# Patient Record
Sex: FEMALE | Race: WHITE | URBAN - METROPOLITAN AREA
[De-identification: names, ages, dates, MRNs, and addresses within clinical notes are randomized per-mention and may not be internally consistent; named-entity substitution may affect disease eponyms.]

---

## 2020-07-27 ENCOUNTER — OFFICE VISIT (OUTPATIENT)
Dept: URBAN - METROPOLITAN AREA CLINIC 23 | Facility: CLINIC | Age: 70
End: 2020-07-27
Payer: MEDICARE

## 2020-07-27 DIAGNOSIS — R53.83 DECREASED ENERGY: ICD-10-CM

## 2020-07-27 DIAGNOSIS — R53.82 CHRONIC FATIGUE: ICD-10-CM

## 2020-07-27 DIAGNOSIS — K86.81 EXOCRINE PANCREATIC INSUFFICIENCY: ICD-10-CM

## 2020-07-27 DIAGNOSIS — Z98.84 HISTORY OF ROUX-EN-Y GASTRIC BYPASS: ICD-10-CM

## 2020-07-27 DIAGNOSIS — Z86.010 HISTORY OF COLON POLYPS: ICD-10-CM

## 2020-07-27 PROCEDURE — G9903 PT SCRN TBCO ID AS NON USER: HCPCS | Performed by: INTERNAL MEDICINE

## 2020-07-27 PROCEDURE — 3017F COLORECTAL CA SCREEN DOC REV: CPT | Performed by: INTERNAL MEDICINE

## 2020-07-27 PROCEDURE — G8427 DOCREV CUR MEDS BY ELIG CLIN: HCPCS | Performed by: INTERNAL MEDICINE

## 2020-07-27 PROCEDURE — G8420 CALC BMI NORM PARAMETERS: HCPCS | Performed by: INTERNAL MEDICINE

## 2020-07-27 PROCEDURE — 1036F TOBACCO NON-USER: CPT | Performed by: INTERNAL MEDICINE

## 2020-07-27 PROCEDURE — 99244 OFF/OP CNSLTJ NEW/EST MOD 40: CPT | Performed by: INTERNAL MEDICINE

## 2020-07-27 PROCEDURE — G9622 NO UNHEAL ETOH USER: HCPCS | Performed by: INTERNAL MEDICINE

## 2020-07-27 RX ORDER — THIAMINE HCL 100 MG
1 TABLET TABLET ORAL ONCE A DAY
Status: ACTIVE | COMMUNITY

## 2020-07-27 RX ORDER — LIFITEGRAST 50 MG/ML
1 DROP INTO AFFECTED EYE SOLUTION/ DROPS OPHTHALMIC TWICE A DAY
Status: ACTIVE | COMMUNITY

## 2020-07-27 RX ORDER — DEXLANSOPRAZOLE 60 MG/1
1 CAPSULE CAPSULE, DELAYED RELEASE ORAL ONCE A DAY
Status: ACTIVE | COMMUNITY

## 2020-07-27 RX ORDER — MELOXICAM 15 MG/1
1 TABLET TABLET ORAL ONCE A DAY
Status: ACTIVE | COMMUNITY

## 2020-07-27 RX ORDER — BRIMONIDINE TARTRATE, TIMOLOL MALEATE 2; 5 MG/ML; MG/ML
1 DROP INTO AFFECTED EYE SOLUTION/ DROPS OPHTHALMIC TWICE A DAY
Status: ACTIVE | COMMUNITY

## 2020-07-27 RX ORDER — SODIUM, POTASSIUM,MAG SULFATES 17.5-3.13G
17.5-13.3-1.6 GM/177ML SOLUTION, RECONSTITUTED, ORAL ORAL AS DIRECTED
Qty: 354 MILLILITER | OUTPATIENT
Start: 2020-07-27

## 2020-07-27 RX ORDER — DENOSUMAB 60 MG/ML
AS DIRECTED INJECTION SUBCUTANEOUS
Status: ACTIVE | COMMUNITY

## 2020-07-27 RX ORDER — LEVETIRACETAM 500 MG
1 TABLET TABLET ORAL TWICE A DAY
Status: ACTIVE | COMMUNITY

## 2020-07-27 NOTE — HPI-TODAY'S VISIT:
70-year-old  female with history of Brittaney-en-Y gastric bypass in 2001, presents with chronic problem of abdominal distention, bloating, oily stool, abdominal pain from cramping, burping and hiccups, noisy abdominal sound.  She tried align and yogurt, Dexilant 7/2 from PCP, hyoscyamine 7/16 from PCP which did not help.  She showed me a picture of stool, soft, large amount, oily droplets on the surface of water. Also complains of fatigue.  History of anemia.  Had iron infusion twice August 2019. She is on vitamin B12 every other day.  1000 unit. Osteoporosis. She had a colon polyp 5 years ago.  No family history of colon cancer.  Denies weight loss.  Hemorrhoids.

## 2020-07-28 ENCOUNTER — LAB OUTSIDE AN ENCOUNTER (OUTPATIENT)
Dept: URBAN - METROPOLITAN AREA CLINIC 23 | Facility: CLINIC | Age: 70
End: 2020-07-28

## 2020-07-28 ENCOUNTER — TELEPHONE ENCOUNTER (OUTPATIENT)
Dept: URBAN - METROPOLITAN AREA CLINIC 23 | Facility: CLINIC | Age: 70
End: 2020-07-28

## 2020-07-28 LAB
FERRITIN, SERUM: 4121
HEMATOCRIT: 42.7
HEMOGLOBIN: 14.1
IRON BIND.CAP.(TIBC): 222
IRON SATURATION: 91
IRON: 201
MCH: 31.5
MCHC: 33
MCV: 95
NRBC: (no result)
PLATELETS: 242
RBC: 4.48
RDW: 15.9
UIBC: 21
VITAMIN B12: >2000
WBC: 5.6

## 2020-07-30 ENCOUNTER — WEB ENCOUNTER (OUTPATIENT)
Dept: URBAN - METROPOLITAN AREA CLINIC 23 | Facility: CLINIC | Age: 70
End: 2020-07-30

## 2020-07-31 ENCOUNTER — TELEPHONE ENCOUNTER (OUTPATIENT)
Dept: URBAN - METROPOLITAN AREA CLINIC 23 | Facility: CLINIC | Age: 70
End: 2020-07-31

## 2020-07-31 RX ORDER — PANCRELIPASE 36000; 180000; 114000 [USP'U]/1; [USP'U]/1; [USP'U]/1
AS DIRECTED CAPSULE, DELAYED RELEASE PELLETS ORAL
Qty: 250 | Refills: 1 | OUTPATIENT
Start: 2020-08-04 | End: 2020-10-03

## 2020-08-01 LAB — PANCREATIC ELASTASE, FECAL: 315

## 2020-08-04 ENCOUNTER — TELEPHONE ENCOUNTER (OUTPATIENT)
Dept: URBAN - METROPOLITAN AREA CLINIC 23 | Facility: CLINIC | Age: 70
End: 2020-08-04

## 2020-08-10 ENCOUNTER — TELEPHONE ENCOUNTER (OUTPATIENT)
Dept: URBAN - METROPOLITAN AREA CLINIC 23 | Facility: CLINIC | Age: 70
End: 2020-08-10

## 2020-08-11 ENCOUNTER — OFFICE VISIT (OUTPATIENT)
Dept: URBAN - METROPOLITAN AREA CLINIC 23 | Facility: CLINIC | Age: 70
End: 2020-08-11

## 2020-08-11 LAB
A/G RATIO: 0.8
ALBUMIN: 3
ALKALINE PHOSPHATASE: 254
ALT (SGPT): 562
AST (SGOT): 633
BILIRUBIN, TOTAL: 3.6
BUN/CREATININE RATIO: 19
BUN: 17
CALCIUM: 9
CARBON DIOXIDE, TOTAL: 19
CHLORIDE: 109
CREATININE: 0.91
EGFR IF AFRICN AM: 73
EGFR IF NONAFRICN AM: 63
GLOBULIN, TOTAL: 3.8
GLUCOSE: 104
HEREDITARY  HEMOCHROMATOSIS: (no result)
POTASSIUM: 4.2
PROTEIN, TOTAL: 6.8
SODIUM: 140

## 2020-08-17 ENCOUNTER — OFFICE VISIT (OUTPATIENT)
Dept: URBAN - METROPOLITAN AREA CLINIC 23 | Facility: CLINIC | Age: 70
End: 2020-08-17
Payer: MEDICARE

## 2020-08-17 ENCOUNTER — LAB OUTSIDE AN ENCOUNTER (OUTPATIENT)
Dept: URBAN - METROPOLITAN AREA CLINIC 23 | Facility: CLINIC | Age: 70
End: 2020-08-17

## 2020-08-17 DIAGNOSIS — E83.118 OTHER HEMOCHROMATOSIS: ICD-10-CM

## 2020-08-17 DIAGNOSIS — Z98.84 HISTORY OF ROUX-EN-Y GASTRIC BYPASS: ICD-10-CM

## 2020-08-17 DIAGNOSIS — K86.81 EXOCRINE PANCREATIC INSUFFICIENCY: ICD-10-CM

## 2020-08-17 DIAGNOSIS — K90.89: ICD-10-CM

## 2020-08-17 DIAGNOSIS — R74.8 ELEVATED LIVER ENZYMES: ICD-10-CM

## 2020-08-17 DIAGNOSIS — K90.9 STEATORRHEA: ICD-10-CM

## 2020-08-17 PROCEDURE — 1036F TOBACCO NON-USER: CPT | Performed by: INTERNAL MEDICINE

## 2020-08-17 PROCEDURE — 99215 OFFICE O/P EST HI 40 MIN: CPT | Performed by: INTERNAL MEDICINE

## 2020-08-17 PROCEDURE — G9903 PT SCRN TBCO ID AS NON USER: HCPCS | Performed by: INTERNAL MEDICINE

## 2020-08-17 PROCEDURE — G9622 NO UNHEAL ETOH USER: HCPCS | Performed by: INTERNAL MEDICINE

## 2020-08-17 PROCEDURE — G8417 CALC BMI ABV UP PARAM F/U: HCPCS | Performed by: INTERNAL MEDICINE

## 2020-08-17 PROCEDURE — 3017F COLORECTAL CA SCREEN DOC REV: CPT | Performed by: INTERNAL MEDICINE

## 2020-08-17 PROCEDURE — G8427 DOCREV CUR MEDS BY ELIG CLIN: HCPCS | Performed by: INTERNAL MEDICINE

## 2020-08-17 PROCEDURE — 99214 OFFICE O/P EST MOD 30 MIN: CPT | Performed by: INTERNAL MEDICINE

## 2020-08-17 PROCEDURE — 82104 ALPHA-1-ANTITRYPSIN PHENO: CPT | Performed by: INTERNAL MEDICINE

## 2020-08-17 RX ORDER — SODIUM, POTASSIUM,MAG SULFATES 17.5-3.13G
17.5-13.3-1.6 GM/177ML SOLUTION, RECONSTITUTED, ORAL ORAL AS DIRECTED
Qty: 354 MILLILITER | Status: ACTIVE | COMMUNITY
Start: 2020-07-27

## 2020-08-17 RX ORDER — MELOXICAM 15 MG/1
1 TABLET TABLET ORAL ONCE A DAY
Status: ACTIVE | COMMUNITY

## 2020-08-17 RX ORDER — DEXLANSOPRAZOLE 60 MG/1
1 CAPSULE CAPSULE, DELAYED RELEASE ORAL ONCE A DAY
Status: ACTIVE | COMMUNITY

## 2020-08-17 RX ORDER — PANCRELIPASE 36000; 180000; 114000 [USP'U]/1; [USP'U]/1; [USP'U]/1
AS DIRECTED CAPSULE, DELAYED RELEASE PELLETS ORAL
Qty: 250 | Refills: 1 | Status: ACTIVE | COMMUNITY
Start: 2020-08-04 | End: 2020-10-03

## 2020-08-17 RX ORDER — LEVETIRACETAM 500 MG
1 TABLET TABLET ORAL TWICE A DAY
Status: ACTIVE | COMMUNITY

## 2020-08-17 RX ORDER — BRIMONIDINE TARTRATE, TIMOLOL MALEATE 2; 5 MG/ML; MG/ML
1 DROP INTO AFFECTED EYE SOLUTION/ DROPS OPHTHALMIC TWICE A DAY
Status: ACTIVE | COMMUNITY

## 2020-08-17 RX ORDER — DENOSUMAB 60 MG/ML
AS DIRECTED INJECTION SUBCUTANEOUS
Status: ACTIVE | COMMUNITY

## 2020-08-17 RX ORDER — THIAMINE HCL 100 MG
1 TABLET TABLET ORAL ONCE A DAY
Status: ACTIVE | COMMUNITY

## 2020-08-17 RX ORDER — LIFITEGRAST 50 MG/ML
1 DROP INTO AFFECTED EYE SOLUTION/ DROPS OPHTHALMIC TWICE A DAY
Status: ACTIVE | COMMUNITY

## 2020-08-17 NOTE — HPI-TODAY'S VISIT:
70-year-old  female with history of bariatric Brittaney-en-Y gastric bypass in 2001, presents for follow up of steatorrhea, bloating, flatus/burping. Also, she had iron test given hx of ALEXANDER. However, iron saturation came back abnormal in very high range. HFE gene test came back negative. LFT came back elevated TbIl, AST, ALT moderately high.     No previous history of liver disease. No etoh intake or hx of excessive intake. No family hx of liver disease.   Creon helps with her steatorrhea and bloating. But high oug of pockets, paid hundreds $.   She has not scheduled colonoscopy yet.

## 2020-08-17 NOTE — PREVIOUS WORKUP REVIEWED
:ENDOSCOPIES:LABS: -Labs 7/29/2020: Total bilirubin 3.6, alkaline phosphatase 254, , , total protein 6.8, albumin 3.0, creatinine 0.91, BUN 17, hereditary hemochromatosis negative.-Labs 7/27/2020: WBC 5.6, hemoglobin 14.1, platelet 242, fecal pancreatic elastase 315 normal, ferritin 4121, vitamin B12 higher >2000, iron saturation 91%.IMAGES:

## 2020-08-21 ENCOUNTER — LAB OUTSIDE AN ENCOUNTER (OUTPATIENT)
Dept: URBAN - METROPOLITAN AREA CLINIC 23 | Facility: CLINIC | Age: 70
End: 2020-08-21

## 2020-08-21 ENCOUNTER — TELEPHONE ENCOUNTER (OUTPATIENT)
Dept: URBAN - METROPOLITAN AREA CLINIC 23 | Facility: CLINIC | Age: 70
End: 2020-08-21

## 2020-08-21 LAB
ACTIN (SMOOTH MUSCLE) ANTIBODY: 54
ALPHA-1-ANTITRYPSIN, SERUM: 135
ANA DIRECT: NEGATIVE
ANTIMYELOPEROXIDASE (MPO) ABS: <9
ANTIPROTEINASE 3 (PR-3) ABS: <3.5
ATYPICAL PANCA: (no result)
CERULOPLASMIN: 24.7
CYTOPLASMIC (C-ANCA): (no result)
HBSAG SCREEN: NEGATIVE
HEP A AB, IGM: NEGATIVE
HEP B CORE AB, TOT: NEGATIVE
HEP B SURFACE AB, QUAL: REACTIVE
HEP C VIRUS AB: 0.2
IGG, IMMUNOGLOBULIN G (RDL): 2102
MITOCHONDRIAL (M2) ANTIBODY: <20
PERINUCLEAR (P-ANCA): (no result)
PHENOTYPE (PI): (no result)

## 2020-08-21 RX ORDER — PANCRELIPASE 36000; 180000; 114000 [USP'U]/1; [USP'U]/1; [USP'U]/1
AS DIRECTED CAPSULE, DELAYED RELEASE PELLETS ORAL
Qty: 750 CAPS | Refills: 1
Start: 2020-08-04 | End: 2021-01-30

## 2020-08-24 ENCOUNTER — OFFICE VISIT (OUTPATIENT)
Dept: URBAN - METROPOLITAN AREA CLINIC 23 | Facility: CLINIC | Age: 70
End: 2020-08-24

## 2020-08-26 ENCOUNTER — TELEPHONE ENCOUNTER (OUTPATIENT)
Dept: URBAN - METROPOLITAN AREA CLINIC 77 | Facility: CLINIC | Age: 70
End: 2020-08-26

## 2020-08-27 ENCOUNTER — OUT OF OFFICE VISIT (OUTPATIENT)
Dept: URBAN - METROPOLITAN AREA MEDICAL CENTER 27 | Facility: MEDICAL CENTER | Age: 70
End: 2020-08-27
Payer: MEDICARE

## 2020-08-27 DIAGNOSIS — K86.81 EXOCRINE PANCREATIC INSUFFICIENCY: ICD-10-CM

## 2020-08-27 DIAGNOSIS — R74.0 ABNORMAL AST AND ALT: ICD-10-CM

## 2020-08-27 DIAGNOSIS — R79.89 ABNORMAL C-REACTIVE PROTEIN: ICD-10-CM

## 2020-08-27 DIAGNOSIS — R74.8 ABNORMAL ALKALINE PHOSPHATASE TEST: ICD-10-CM

## 2020-08-27 PROBLEM — 66187002: Status: ACTIVE | Noted: 2020-08-27

## 2020-08-27 PROBLEM — 399187006: Status: ACTIVE | Noted: 2020-07-28

## 2020-08-27 PROCEDURE — 99222 1ST HOSP IP/OBS MODERATE 55: CPT | Performed by: INTERNAL MEDICINE

## 2020-08-27 PROCEDURE — G8427 DOCREV CUR MEDS BY ELIG CLIN: HCPCS | Performed by: INTERNAL MEDICINE

## 2020-08-28 ENCOUNTER — OUT OF OFFICE VISIT (OUTPATIENT)
Dept: URBAN - METROPOLITAN AREA MEDICAL CENTER 27 | Facility: MEDICAL CENTER | Age: 70
End: 2020-08-28
Payer: MEDICARE

## 2020-08-28 DIAGNOSIS — R74.8 ABNORMAL ALKALINE PHOSPHATASE TEST: ICD-10-CM

## 2020-08-28 DIAGNOSIS — K86.81 EXOCRINE PANCREATIC INSUFFICIENCY: ICD-10-CM

## 2020-08-28 DIAGNOSIS — R79.89 ABNORMAL C-REACTIVE PROTEIN: ICD-10-CM

## 2020-08-28 DIAGNOSIS — R74.0 ABNORMAL AST AND ALT: ICD-10-CM

## 2020-08-28 PROCEDURE — 99232 SBSQ HOSP IP/OBS MODERATE 35: CPT | Performed by: INTERNAL MEDICINE

## 2020-08-29 ENCOUNTER — OUT OF OFFICE VISIT (OUTPATIENT)
Dept: URBAN - METROPOLITAN AREA MEDICAL CENTER 27 | Facility: MEDICAL CENTER | Age: 70
End: 2020-08-29
Payer: MEDICARE

## 2020-08-29 DIAGNOSIS — R79.89 ABNORMAL C-REACTIVE PROTEIN: ICD-10-CM

## 2020-08-29 DIAGNOSIS — R19.7 ACUTE DIARRHEA: ICD-10-CM

## 2020-08-29 DIAGNOSIS — R74.0 ABNORMAL AST AND ALT: ICD-10-CM

## 2020-08-29 DIAGNOSIS — K86.81 EXOCRINE PANCREATIC INSUFFICIENCY: ICD-10-CM

## 2020-08-29 PROCEDURE — 99232 SBSQ HOSP IP/OBS MODERATE 35: CPT | Performed by: INTERNAL MEDICINE

## 2020-08-31 ENCOUNTER — OUT OF OFFICE VISIT (OUTPATIENT)
Dept: URBAN - METROPOLITAN AREA MEDICAL CENTER 27 | Facility: MEDICAL CENTER | Age: 70
End: 2020-08-31
Payer: MEDICARE

## 2020-08-31 ENCOUNTER — TELEPHONE ENCOUNTER (OUTPATIENT)
Dept: URBAN - METROPOLITAN AREA CLINIC 23 | Facility: CLINIC | Age: 70
End: 2020-08-31

## 2020-08-31 DIAGNOSIS — R74.0 ABNORMAL AST AND ALT: ICD-10-CM

## 2020-08-31 DIAGNOSIS — K86.81 EXOCRINE PANCREATIC INSUFFICIENCY: ICD-10-CM

## 2020-08-31 DIAGNOSIS — R10.12 ABDOMINAL BURNING SENSATION IN LEFT UPPER QUADRANT: ICD-10-CM

## 2020-08-31 DIAGNOSIS — R79.89 ABNORMAL C-REACTIVE PROTEIN: ICD-10-CM

## 2020-08-31 DIAGNOSIS — R19.7 ACUTE DIARRHEA: ICD-10-CM

## 2020-08-31 PROCEDURE — 99232 SBSQ HOSP IP/OBS MODERATE 35: CPT | Performed by: INTERNAL MEDICINE

## 2020-09-01 ENCOUNTER — OFFICE VISIT (OUTPATIENT)
Dept: URBAN - METROPOLITAN AREA CLINIC 23 | Facility: CLINIC | Age: 70
End: 2020-09-01

## 2020-09-08 ENCOUNTER — OFFICE VISIT (OUTPATIENT)
Dept: URBAN - METROPOLITAN AREA CLINIC 23 | Facility: CLINIC | Age: 70
End: 2020-09-08
Payer: MEDICARE

## 2020-09-08 DIAGNOSIS — Z98.84 HISTORY OF ROUX-EN-Y GASTRIC BYPASS: ICD-10-CM

## 2020-09-08 DIAGNOSIS — Z86.010 HISTORY OF COLON POLYPS: ICD-10-CM

## 2020-09-08 DIAGNOSIS — K27.5: ICD-10-CM

## 2020-09-08 DIAGNOSIS — K75.4 AUTOIMMUNE HEPATITIS: ICD-10-CM

## 2020-09-08 DIAGNOSIS — K86.81 EXOCRINE PANCREATIC INSUFFICIENCY: ICD-10-CM

## 2020-09-08 DIAGNOSIS — K75.81 NASH (NONALCOHOLIC STEATOHEPATITIS): ICD-10-CM

## 2020-09-08 DIAGNOSIS — K28.9 MARGINAL ULCER: ICD-10-CM

## 2020-09-08 PROCEDURE — 99214 OFFICE O/P EST MOD 30 MIN: CPT | Performed by: INTERNAL MEDICINE

## 2020-09-08 PROCEDURE — 82784 ASSAY IGA/IGD/IGG/IGM EACH: CPT | Performed by: INTERNAL MEDICINE

## 2020-09-08 PROCEDURE — G9903 PT SCRN TBCO ID AS NON USER: HCPCS | Performed by: INTERNAL MEDICINE

## 2020-09-08 PROCEDURE — G8427 DOCREV CUR MEDS BY ELIG CLIN: HCPCS | Performed by: INTERNAL MEDICINE

## 2020-09-08 PROCEDURE — 99215 OFFICE O/P EST HI 40 MIN: CPT | Performed by: INTERNAL MEDICINE

## 2020-09-08 PROCEDURE — G8417 CALC BMI ABV UP PARAM F/U: HCPCS | Performed by: INTERNAL MEDICINE

## 2020-09-08 PROCEDURE — 1036F TOBACCO NON-USER: CPT | Performed by: INTERNAL MEDICINE

## 2020-09-08 PROCEDURE — G9622 NO UNHEAL ETOH USER: HCPCS | Performed by: INTERNAL MEDICINE

## 2020-09-08 PROCEDURE — 82657 ENZYME CELL ACTIVITY: CPT | Performed by: INTERNAL MEDICINE

## 2020-09-08 PROCEDURE — 3017F COLORECTAL CA SCREEN DOC REV: CPT | Performed by: INTERNAL MEDICINE

## 2020-09-08 RX ORDER — PANCRELIPASE 36000; 180000; 114000 [USP'U]/1; [USP'U]/1; [USP'U]/1
AS DIRECTED CAPSULE, DELAYED RELEASE PELLETS ORAL
Qty: 750 CAPS | Refills: 1 | Status: ACTIVE | COMMUNITY
Start: 2020-08-04 | End: 2021-01-30

## 2020-09-08 RX ORDER — FUROSEMIDE 20 MG/1
1 TABLET TABLET ORAL ONCE A DAY
Qty: 7 TABLET | Refills: 1 | OUTPATIENT
Start: 2020-09-08

## 2020-09-08 RX ORDER — AZATHIOPRINE 50 MG/1
1 TAB TABLET ORAL ONCE DAILY
Qty: 90 | Refills: 1 | OUTPATIENT
Start: 2020-09-08 | End: 2021-03-06

## 2020-09-08 RX ORDER — BRIMONIDINE TARTRATE, TIMOLOL MALEATE 2; 5 MG/ML; MG/ML
1 DROP INTO AFFECTED EYE SOLUTION/ DROPS OPHTHALMIC TWICE A DAY
Status: ACTIVE | COMMUNITY

## 2020-09-08 RX ORDER — LIFITEGRAST 50 MG/ML
1 DROP INTO AFFECTED EYE SOLUTION/ DROPS OPHTHALMIC TWICE A DAY
Status: ACTIVE | COMMUNITY

## 2020-09-08 RX ORDER — PREDNISONE 10 MG/1
4 TABLET FOR NOW, FOLLOW FURTHER INSTRUCTION WITH TAPERING TABLET ORAL ONCE A DAY
Qty: 360 TABLET | Refills: 0 | OUTPATIENT
Start: 2020-09-08 | End: 2020-12-06

## 2020-09-08 RX ORDER — DENOSUMAB 60 MG/ML
AS DIRECTED INJECTION SUBCUTANEOUS
Status: ACTIVE | COMMUNITY

## 2020-09-08 RX ORDER — THIAMINE HCL 100 MG
1 TABLET TABLET ORAL ONCE A DAY
Status: ACTIVE | COMMUNITY

## 2020-09-08 RX ORDER — SODIUM, POTASSIUM,MAG SULFATES 17.5-3.13G
17.5-13.3-1.6 GM/177ML SOLUTION, RECONSTITUTED, ORAL ORAL AS DIRECTED
Qty: 354 MILLILITER | Status: ACTIVE | COMMUNITY
Start: 2020-07-27

## 2020-09-08 RX ORDER — LEVETIRACETAM 500 MG
1 TABLET TABLET ORAL TWICE A DAY
Status: ACTIVE | COMMUNITY

## 2020-09-08 NOTE — HPI-TODAY'S VISIT:
71-year-old  female who presents for follow-up after hospital stay for perforated marginal ulcer. Repair surgery done. She is doing well for now, plan to advance diet to soft diet today per her surgeon.   She takes Protonix twice a day as instructed. Stopped taking Mobic.  Also she is following up on elevated liver enzymes.  Her serology and biopsy results support diagnosis of autoimmune hepatitis and advanced fibrosis.  Garibay cannot be ruled out as a cause for advanced fibrosis.

## 2020-09-08 NOTE — PREVIOUS WORKUP REVIEWED
:ENDOSCOPIES:LABS: -Lab 8/28/2020: WBC 11.7, hemoglobin 13.3, platelet 208, total bilirubin 1.7, alkaline phosphatase 168, AST 95, . H pyloti ab neg. -Labs 8/17/2020: IgG 2102 H, ceruloplasmin 24.7, hep B surface antibody positive, anti-smooth muscle antibody 54 H, anti-mitochondrial antibody negative, hep A IgM negative, alpha-1 antitrypsin 135 MZ, ANCA panel negative, EDUARDO negative, hep B core antibody negative, hep B surface antigen negative, hep C antibody.2,-Labs 7/29/2020: Total bilirubin 3.6, alkaline phosphatase 254, , , total protein 6.8, albumin 3.0, creatinine 0.91, BUN 17, hereditary hemochromatosis negative.-Labs 7/28/2020: pancreatic elastase, fecal 315.-Labs 7/27/2020: WBC 5.6, hemoglobin 14.1, platelet 242, fecal pancreatic elastase 315 normal, ferritin 4121, vitamin B12 higher >2000, iron saturation 91%.-Labs 1/8/2019: total protein 6.2, albumin 3.5, total bilirubin 0.6, alkaline phosphatase 122, AST 38 H, ALT 61 H.-Labs 1/9/2019 total bilirubin 0.6, alkaline phosphatase 110, AST 25, ALT 36.IMAGES:-CT abdomen and pelvis with contrast 8/26/2020: status post gastric bypass. Small hard to hernia. Pneumoperitoneum consistent with the bowel perforation. Sigmoid diverticulosis. Abdominal ascites. Normal pancreas. Normal spleen with normal size. Normal liver.-Abdominal ultrasound 8/21/2020: the liver mass. Normal biliary duct. Patent portal vein. Status post cholecystectomy. Normal spleen, 12 cm. Normal pancreas. No ascites.

## 2020-09-09 ENCOUNTER — TELEPHONE ENCOUNTER (OUTPATIENT)
Dept: URBAN - METROPOLITAN AREA CLINIC 23 | Facility: CLINIC | Age: 70
End: 2020-09-09

## 2020-09-09 LAB
A/G RATIO: 0.6
ALBUMIN: 2.9
ALKALINE PHOSPHATASE: 373
ALT (SGPT): 82
AST (SGOT): 152
BILIRUBIN, TOTAL: 1.4
BUN/CREATININE RATIO: 9
BUN: 7
CALCIUM: 9.1
CARBON DIOXIDE, TOTAL: 25
CHLORIDE: 99
CREATININE: 0.74
EGFR IF AFRICN AM: 94
EGFR IF NONAFRICN AM: 81
GLOBULIN, TOTAL: 4.5
GLUCOSE: 92
HEMATOCRIT: 40.9
HEMOGLOBIN: 14.2
IMMUNOGLOBULIN G, QN, SERUM: 2604
MCH: 34
MCHC: 34.7
MCV: 98
NRBC: (no result)
PLATELETS: 364
POTASSIUM: 4.3
PROTEIN, TOTAL: 7.4
RBC: 4.18
RDW: 15.6
SODIUM: 137
WBC: 9.9

## 2020-09-17 ENCOUNTER — TELEPHONE ENCOUNTER (OUTPATIENT)
Dept: URBAN - METROPOLITAN AREA CLINIC 23 | Facility: CLINIC | Age: 70
End: 2020-09-17

## 2020-09-17 LAB
ALBUMIN: 2.6
ALKALINE PHOSPHATASE: 218
ALT (SGPT): 74
AST (SGOT): 65
BILIRUBIN, DIRECT: 0.63
BILIRUBIN, TOTAL: 1.1
IMMUNOGLOBULIN G, QN, SERUM: 1990
PROTEIN, TOTAL: 6.2

## 2020-09-21 ENCOUNTER — OFFICE VISIT (OUTPATIENT)
Dept: URBAN - METROPOLITAN AREA CLINIC 23 | Facility: CLINIC | Age: 70
End: 2020-09-21

## 2020-09-23 ENCOUNTER — LAB OUTSIDE AN ENCOUNTER (OUTPATIENT)
Dept: URBAN - METROPOLITAN AREA CLINIC 78 | Facility: CLINIC | Age: 70
End: 2020-09-23

## 2020-09-24 ENCOUNTER — TELEPHONE ENCOUNTER (OUTPATIENT)
Dept: URBAN - METROPOLITAN AREA CLINIC 23 | Facility: CLINIC | Age: 70
End: 2020-09-24

## 2020-09-24 LAB
ALBUMIN: 3
ALKALINE PHOSPHATASE: 166
ALT (SGPT): 63
AST (SGOT): 51
BILIRUBIN, DIRECT: 0.51
BILIRUBIN, TOTAL: 0.9
IMMUNOGLOBULIN G, QN, SERUM: 1590
PROTEIN, TOTAL: 5.9

## 2020-10-03 LAB
A/G RATIO: 1
ALBUMIN: 3.1
ALKALINE PHOSPHATASE: 149
ALT (SGPT): 56
AST (SGOT): 48
BILIRUBIN, TOTAL: 0.8
BUN/CREATININE RATIO: 23
BUN: 19
CALCIUM: 8.7
CARBON DIOXIDE, TOTAL: 23
CHLORIDE: 108
CREATININE: 0.81
EGFR IF AFRICN AM: 84
EGFR IF NONAFRICN AM: 73
GLOBULIN, TOTAL: 3
GLUCOSE: 89
HEMATOCRIT: 39.9
HEMOGLOBIN: 13.9
IGG, IMMUNOGLOBULIN G (RDL): 1241
MCH: 34.6
MCHC: 34.8
MCV: 99
NRBC: (no result)
PLATELETS: 130
POTASSIUM: 4.2
PROTEIN, TOTAL: 6.1
RBC: 4.02
RDW: 14.8
SODIUM: 143
WBC: 10

## 2020-10-04 ENCOUNTER — TELEPHONE ENCOUNTER (OUTPATIENT)
Dept: URBAN - METROPOLITAN AREA CLINIC 23 | Facility: CLINIC | Age: 70
End: 2020-10-04

## 2020-10-06 ENCOUNTER — OFFICE VISIT (OUTPATIENT)
Dept: URBAN - METROPOLITAN AREA CLINIC 23 | Facility: CLINIC | Age: 70
End: 2020-10-06
Payer: MEDICARE

## 2020-10-06 VITALS
SYSTOLIC BLOOD PRESSURE: 117 MMHG | HEART RATE: 85 BPM | BODY MASS INDEX: 27.99 KG/M2 | TEMPERATURE: 96.9 F | DIASTOLIC BLOOD PRESSURE: 82 MMHG | HEIGHT: 65 IN | WEIGHT: 168 LBS

## 2020-10-06 DIAGNOSIS — K75.81 NASH (NONALCOHOLIC STEATOHEPATITIS): ICD-10-CM

## 2020-10-06 DIAGNOSIS — K28.9 MARGINAL ULCER: ICD-10-CM

## 2020-10-06 DIAGNOSIS — K86.81 EXOCRINE PANCREATIC INSUFFICIENCY: ICD-10-CM

## 2020-10-06 DIAGNOSIS — K75.4 AUTOIMMUNE HEPATITIS: ICD-10-CM

## 2020-10-06 DIAGNOSIS — Z98.84 HISTORY OF ROUX-EN-Y GASTRIC BYPASS: ICD-10-CM

## 2020-10-06 DIAGNOSIS — K74.02 ADVANCED HEPATIC FIBROSIS: ICD-10-CM

## 2020-10-06 DIAGNOSIS — Z86.010 HISTORY OF COLON POLYPS: ICD-10-CM

## 2020-10-06 PROCEDURE — 1036F TOBACCO NON-USER: CPT | Performed by: INTERNAL MEDICINE

## 2020-10-06 PROCEDURE — 3017F COLORECTAL CA SCREEN DOC REV: CPT | Performed by: INTERNAL MEDICINE

## 2020-10-06 PROCEDURE — G8427 DOCREV CUR MEDS BY ELIG CLIN: HCPCS | Performed by: INTERNAL MEDICINE

## 2020-10-06 PROCEDURE — G8420 CALC BMI NORM PARAMETERS: HCPCS | Performed by: INTERNAL MEDICINE

## 2020-10-06 PROCEDURE — G9622 NO UNHEAL ETOH USER: HCPCS | Performed by: INTERNAL MEDICINE

## 2020-10-06 PROCEDURE — 99214 OFFICE O/P EST MOD 30 MIN: CPT | Performed by: INTERNAL MEDICINE

## 2020-10-06 PROCEDURE — G9903 PT SCRN TBCO ID AS NON USER: HCPCS | Performed by: INTERNAL MEDICINE

## 2020-10-06 RX ORDER — PREDNISONE 10 MG/1
4 TABLET FOR NOW, FOLLOW FURTHER INSTRUCTION WITH TAPERING TABLET ORAL ONCE A DAY
Qty: 360 TABLET | Refills: 0 | Status: ACTIVE | COMMUNITY
Start: 2020-09-08 | End: 2020-12-06

## 2020-10-06 RX ORDER — LEVETIRACETAM 500 MG
1 TABLET TABLET ORAL TWICE A DAY
Status: ACTIVE | COMMUNITY

## 2020-10-06 RX ORDER — THIAMINE HCL 100 MG
1 TABLET TABLET ORAL ONCE A DAY
Status: ACTIVE | COMMUNITY

## 2020-10-06 RX ORDER — BRIMONIDINE TARTRATE, TIMOLOL MALEATE 2; 5 MG/ML; MG/ML
1 DROP INTO AFFECTED EYE SOLUTION/ DROPS OPHTHALMIC TWICE A DAY
Status: ACTIVE | COMMUNITY

## 2020-10-06 RX ORDER — AZATHIOPRINE 50 MG/1
1 TAB TABLET ORAL ONCE DAILY
Qty: 90 | Refills: 1 | Status: ACTIVE | COMMUNITY
Start: 2020-09-08 | End: 2021-03-06

## 2020-10-06 RX ORDER — FUROSEMIDE 20 MG/1
1 TABLET TABLET ORAL ONCE A DAY
Qty: 7 TABLET | Refills: 1 | Status: ACTIVE | COMMUNITY
Start: 2020-09-08

## 2020-10-06 RX ORDER — DENOSUMAB 60 MG/ML
AS DIRECTED INJECTION SUBCUTANEOUS
Status: ACTIVE | COMMUNITY

## 2020-10-06 RX ORDER — SODIUM, POTASSIUM,MAG SULFATES 17.5-3.13G
17.5-13.3-1.6 GM/177ML SOLUTION, RECONSTITUTED, ORAL ORAL AS DIRECTED
Qty: 354 MILLILITER | Status: ACTIVE | COMMUNITY
Start: 2020-07-27

## 2020-10-06 RX ORDER — PANCRELIPASE 36000; 180000; 114000 [USP'U]/1; [USP'U]/1; [USP'U]/1
AS DIRECTED CAPSULE, DELAYED RELEASE PELLETS ORAL
Qty: 750 CAPS | Refills: 1 | Status: ACTIVE | COMMUNITY
Start: 2020-08-04 | End: 2021-01-30

## 2020-10-06 RX ORDER — LIFITEGRAST 50 MG/ML
1 DROP INTO AFFECTED EYE SOLUTION/ DROPS OPHTHALMIC TWICE A DAY
Status: ACTIVE | COMMUNITY

## 2020-10-06 NOTE — HPI-TODAY'S VISIT:
70-year-old  female with autoimmune hepatitis and Garibay, perforated marginal ulcer,  presents for follow-up.   For AIH, she has been on 40 mg prednisone and Imuran 50 mg.  TPMT test came back normal.  She reports fluid retention in the legs, tremor, muscle weakness.  Good sleep hygiene.  No irritation.   She reports puslike material coming out from surgical wound in the umbilicus.  She is to see Dr. Loomis tomorrow. She is on modified diet, still has some intermittent epigastric discomfort.  She is on pantoprazole 40 mg twice daily. She takes Creon.  Still has loose stools.

## 2020-10-06 NOTE — PREVIOUS WORKUP REVIEWED
:ENDOSCOPIES:LABS: -Labs 9/30/2020: total bilirubin 0.8, alkaline phosphatase 149, AST 48, ALT 56. WBC 10.0, hemoglobin 13.9,, platelet 130, IgG 1241.-Labs 9/23/2020: TPMT enzyme activity normal. total bilirubin 0.9, alkaline phosphatase 166, AST 51, ALT 63. IgG 1590.-Labs 9/16/2020: total bilirubin 1.1, direct bilirubin 0.60, alkaline phosphatase 218, AST 65, ALT 74. IgG 1990.-Labs 9/8/2020: IgG 2604. WBC 9.9, hemoglobin 14.2, platelet 364.-Lab 8/28/2020: WBC 11.7, hemoglobin 13.3, platelet 208, total bilirubin 1.7, alkaline phosphatase 168, AST 95, . H pyloti ab neg. -Labs 8/17/2020: IgG 2102 H, ceruloplasmin 24.7, hep B surface antibody positive, anti-smooth muscle antibody 54 H, anti-mitochondrial antibody negative, hep A IgM negative, alpha-1 antitrypsin 135 MZ, ANCA panel negative, EDUARDO negative, hep B core antibody negative, hep B surface antigen negative, hep C antibody negative,-Labs 7/29/2020: Total bilirubin 3.6, alkaline phosphatase 254, , , total protein 6.8, albumin 3.0, creatinine 0.91, BUN 17, hereditary hemochromatosis negative.-Labs 7/28/2020: pancreatic elastase, fecal 315 normal.-Labs 7/27/2020: WBC 5.6, hemoglobin 14.1, platelet 242, fecal pancreatic elastase 315 normal, ferritin 4121, vitamin B12 higher >2000, iron saturation 91%.-Labs 1/8/2019: total protein 6.2, albumin 3.5, total bilirubin 0.6, alkaline phosphatase 122, AST 38 H, ALT 61 H.-Labs 1/9/2019 total bilirubin 0.6, alkaline phosphatase 110, AST 25, ALT 36.IMAGES:-CT abdomen and pelvis with contrast 8/26/2020: status post gastric bypass. Small hiatal hernia. Pneumoperitoneum consistent with the bowel perforation. Sigmoid diverticulosis. Abdominal ascites. Normal pancreas. Normal spleen with normal size. Normal liver.-Abdominal ultrasound 8/21/2020: No liver mass. Normal biliary duct. Patent portal vein. Status post cholecystectomy. Normal spleen, 12 cm. Normal pancreas. No ascites.LIVER BIOPSY:-Pathology liver wedge biopsy 8/31/2020: worrisome for cirrhosis. Nodular architecture and chronic portal inflammatory infiltrate predominantly lymphocytes with plasma cells. No significant lobular or interface activity. No significant the steatosis were ballooning degeneration. No granulomas. Bridging fibrosis. Mild increase in iron stores.&

## 2020-10-14 ENCOUNTER — LAB OUTSIDE AN ENCOUNTER (OUTPATIENT)
Dept: URBAN - METROPOLITAN AREA CLINIC 78 | Facility: CLINIC | Age: 70
End: 2020-10-14

## 2020-10-15 ENCOUNTER — TELEPHONE ENCOUNTER (OUTPATIENT)
Dept: URBAN - METROPOLITAN AREA CLINIC 23 | Facility: CLINIC | Age: 70
End: 2020-10-15

## 2020-10-15 LAB
A/G RATIO: 1.2
ALBUMIN: 3.4
ALKALINE PHOSPHATASE: 139
ALT (SGPT): 52
AST (SGOT): 47
BILIRUBIN, TOTAL: 0.8
BUN/CREATININE RATIO: 21
BUN: 20
CALCIUM: 8.9
CARBON DIOXIDE, TOTAL: 22
CHLORIDE: 107
CREATININE: 0.95
EGFR IF AFRICN AM: 69
EGFR IF NONAFRICN AM: 60
GLOBULIN, TOTAL: 2.8
GLUCOSE: 80
POTASSIUM: 3.7
PROTEIN, TOTAL: 6.2
SODIUM: 141

## 2020-10-16 ENCOUNTER — WEB ENCOUNTER (OUTPATIENT)
Dept: URBAN - METROPOLITAN AREA CLINIC 23 | Facility: CLINIC | Age: 70
End: 2020-10-16

## 2020-10-19 ENCOUNTER — TELEPHONE ENCOUNTER (OUTPATIENT)
Dept: URBAN - METROPOLITAN AREA CLINIC 23 | Facility: CLINIC | Age: 70
End: 2020-10-19

## 2020-10-20 ENCOUNTER — TELEPHONE ENCOUNTER (OUTPATIENT)
Dept: URBAN - METROPOLITAN AREA CLINIC 77 | Facility: CLINIC | Age: 70
End: 2020-10-20

## 2020-10-20 ENCOUNTER — OFFICE VISIT (OUTPATIENT)
Dept: URBAN - METROPOLITAN AREA MEDICAL CENTER 27 | Facility: MEDICAL CENTER | Age: 70
End: 2020-10-20
Payer: MEDICARE

## 2020-10-20 DIAGNOSIS — Z87.11 H/O PEPTIC ULCER: ICD-10-CM

## 2020-10-20 DIAGNOSIS — K31.89 ACQUIRED DEFORMITY OF PYLORUS: ICD-10-CM

## 2020-10-20 DIAGNOSIS — R19.7 DIARRHEA: ICD-10-CM

## 2020-10-20 DIAGNOSIS — D12.2 ADENOMA OF ASCENDING COLON: ICD-10-CM

## 2020-10-20 PROCEDURE — 45385 COLONOSCOPY W/LESION REMOVAL: CPT | Performed by: INTERNAL MEDICINE

## 2020-10-20 PROCEDURE — 43239 EGD BIOPSY SINGLE/MULTIPLE: CPT | Performed by: INTERNAL MEDICINE

## 2020-10-20 PROCEDURE — G9937 DIG OR SURV COLSCO: HCPCS | Performed by: INTERNAL MEDICINE

## 2020-10-20 PROCEDURE — 45380 COLONOSCOPY AND BIOPSY: CPT | Performed by: INTERNAL MEDICINE

## 2020-10-20 RX ORDER — PREDNISONE 10 MG/1
3 TABLETS FOR NOW, FOLLOW FURTHER INSTRUCTION WITH TAPERING TABLET ORAL ONCE A DAY
Qty: 270 TABLET | Refills: 0
Start: 2020-09-08 | End: 2020-12-06

## 2020-10-21 ENCOUNTER — TELEPHONE ENCOUNTER (OUTPATIENT)
Dept: URBAN - METROPOLITAN AREA CLINIC 23 | Facility: CLINIC | Age: 70
End: 2020-10-21

## 2020-10-28 ENCOUNTER — LAB OUTSIDE AN ENCOUNTER (OUTPATIENT)
Dept: URBAN - METROPOLITAN AREA CLINIC 78 | Facility: CLINIC | Age: 70
End: 2020-10-28

## 2020-10-29 ENCOUNTER — TELEPHONE ENCOUNTER (OUTPATIENT)
Dept: URBAN - METROPOLITAN AREA CLINIC 23 | Facility: CLINIC | Age: 70
End: 2020-10-29

## 2020-10-29 ENCOUNTER — LAB OUTSIDE AN ENCOUNTER (OUTPATIENT)
Dept: URBAN - METROPOLITAN AREA CLINIC 23 | Facility: CLINIC | Age: 70
End: 2020-10-29

## 2020-10-29 LAB
A/G RATIO: 1.3
ALBUMIN: 3.2
ALKALINE PHOSPHATASE: 118
ALT (SGPT): 40
AST (SGOT): 38
BASO (ABSOLUTE): 0.1
BASOS: 1
BILIRUBIN, TOTAL: 0.5
BUN/CREATININE RATIO: 23
BUN: 17
CALCIUM: 8.2
CARBON DIOXIDE, TOTAL: 20
CHLORIDE: 109
CREATININE: 0.75
EGFR IF AFRICN AM: 92
EGFR IF NONAFRICN AM: 80
EOS (ABSOLUTE): 0.2
EOS: 2
GLOBULIN, TOTAL: 2.5
GLUCOSE: 73
HEMATOCRIT: 42
HEMATOLOGY COMMENTS:: (no result)
HEMOGLOBIN: 14.1
IMMATURE CELLS: (no result)
IMMATURE GRANS (ABS): 0.2
IMMATURE GRANULOCYTES: 1
IMMUNOGLOBULIN A, QN, SERUM: 235
LYMPHS (ABSOLUTE): 1.4
LYMPHS: 13
MCH: 34.4
MCHC: 33.6
MCV: 102
MONOCYTES(ABSOLUTE): 0.8
MONOCYTES: 8
NEUTROPHILS (ABSOLUTE): 8.1
NEUTROPHILS: 75
NRBC: (no result)
PLATELETS: 210
POTASSIUM: 4.2
PROTEIN, TOTAL: 5.7
RBC: 4.1
RDW: 14.2
SODIUM: 141
WBC: 10.7

## 2020-11-02 ENCOUNTER — OFFICE VISIT (OUTPATIENT)
Dept: URBAN - METROPOLITAN AREA CLINIC 23 | Facility: CLINIC | Age: 70
End: 2020-11-02
Payer: MEDICARE

## 2020-11-02 ENCOUNTER — WEB ENCOUNTER (OUTPATIENT)
Dept: URBAN - METROPOLITAN AREA CLINIC 23 | Facility: CLINIC | Age: 70
End: 2020-11-02

## 2020-11-02 DIAGNOSIS — K75.81 NASH (NONALCOHOLIC STEATOHEPATITIS): ICD-10-CM

## 2020-11-02 DIAGNOSIS — K86.81 EXOCRINE PANCREATIC INSUFFICIENCY: ICD-10-CM

## 2020-11-02 DIAGNOSIS — K75.4 AUTOIMMUNE HEPATITIS: ICD-10-CM

## 2020-11-02 DIAGNOSIS — K28.9 MARGINAL ULCER: ICD-10-CM

## 2020-11-02 PROCEDURE — 99214 OFFICE O/P EST MOD 30 MIN: CPT | Performed by: INTERNAL MEDICINE

## 2020-11-02 PROCEDURE — G9903 PT SCRN TBCO ID AS NON USER: HCPCS | Performed by: INTERNAL MEDICINE

## 2020-11-02 PROCEDURE — 3017F COLORECTAL CA SCREEN DOC REV: CPT | Performed by: INTERNAL MEDICINE

## 2020-11-02 PROCEDURE — 1036F TOBACCO NON-USER: CPT | Performed by: INTERNAL MEDICINE

## 2020-11-02 PROCEDURE — G8420 CALC BMI NORM PARAMETERS: HCPCS | Performed by: INTERNAL MEDICINE

## 2020-11-02 PROCEDURE — G8427 DOCREV CUR MEDS BY ELIG CLIN: HCPCS | Performed by: INTERNAL MEDICINE

## 2020-11-02 PROCEDURE — G9622 NO UNHEAL ETOH USER: HCPCS | Performed by: INTERNAL MEDICINE

## 2020-11-02 RX ORDER — THIAMINE HCL 100 MG
1 TABLET TABLET ORAL ONCE A DAY
Status: ACTIVE | COMMUNITY

## 2020-11-02 RX ORDER — PANCRELIPASE 36000; 180000; 114000 [USP'U]/1; [USP'U]/1; [USP'U]/1
AS DIRECTED CAPSULE, DELAYED RELEASE PELLETS ORAL
Qty: 750 CAPS | Refills: 1 | Status: ACTIVE | COMMUNITY
Start: 2020-08-04 | End: 2021-01-30

## 2020-11-02 RX ORDER — DENOSUMAB 60 MG/ML
AS DIRECTED INJECTION SUBCUTANEOUS
Status: ACTIVE | COMMUNITY

## 2020-11-02 RX ORDER — LEVETIRACETAM 500 MG
1 TABLET TABLET ORAL TWICE A DAY
Status: ACTIVE | COMMUNITY

## 2020-11-02 RX ORDER — FUROSEMIDE 20 MG/1
1 TABLET TABLET ORAL ONCE A DAY
Qty: 7 TABLET | Refills: 1 | Status: ACTIVE | COMMUNITY
Start: 2020-09-08

## 2020-11-02 RX ORDER — LIFITEGRAST 50 MG/ML
1 DROP INTO AFFECTED EYE SOLUTION/ DROPS OPHTHALMIC TWICE A DAY
Status: ACTIVE | COMMUNITY

## 2020-11-02 RX ORDER — AZATHIOPRINE 50 MG/1
1 TAB TABLET ORAL ONCE DAILY
Qty: 90 | Refills: 1 | Status: ACTIVE | COMMUNITY
Start: 2020-09-08 | End: 2021-03-06

## 2020-11-02 RX ORDER — PREDNISONE 10 MG/1
3 TABLETS FOR NOW, FOLLOW FURTHER INSTRUCTION WITH TAPERING TABLET ORAL ONCE A DAY
Qty: 270 TABLET | Refills: 0 | Status: ACTIVE | COMMUNITY
Start: 2020-09-08 | End: 2020-12-06

## 2020-11-02 RX ORDER — BRIMONIDINE TARTRATE, TIMOLOL MALEATE 2; 5 MG/ML; MG/ML
1 DROP INTO AFFECTED EYE SOLUTION/ DROPS OPHTHALMIC TWICE A DAY
Status: ACTIVE | COMMUNITY

## 2020-11-02 RX ORDER — SODIUM, POTASSIUM,MAG SULFATES 17.5-3.13G
17.5-13.3-1.6 GM/177ML SOLUTION, RECONSTITUTED, ORAL ORAL AS DIRECTED
Qty: 354 MILLILITER | Status: ACTIVE | COMMUNITY
Start: 2020-07-27

## 2020-11-02 NOTE — PREVIOUS WORKUP REVIEWED
:ENDOSCOPIES:-Push enteroscopy 10/20/2020: down to JJ anastomosis. Healthy GJ and JJ anastomosis. Normal exam of the esophagus and the gastric pouch.-Colonoscopy 10/20/2020: hemorrhoids. Normal TI. A 3 mm polyp in the ascending colon. Diverticulosis in the sigmoid colon. Random colon biopsy taken to reroute microscopic colitis. A scar at the ileocecal valve.*Pathology: random gastric-minimal chronic inflammation, negative H. pylori. Ascending colon polyp-tubular adenoma. Random colon biopsy-normal.LABS: -Labs 10/28/2020: WBC 10.7, hemoglobin 14.1, platelet 210, total bilirubin 0.5, alkaline phosphatase 118, AST 38, ALT 40.-Labs 10/14/2020: total bilirubin 0.8, alkaline phosphatase 139, AST 47, ALT 52.-Labs 9/30/2020: total bilirubin 0.8, alkaline phosphatase 149, AST 48, ALT 56. WBC 10.0, hemoglobin 13.9,, platelet 130, IgG 1241.-Labs 9/23/2020: TPMT enzyme activity normal. total bilirubin 0.9, alkaline phosphatase 166, AST 51, ALT 63. IgG 1590.-Labs 9/16/2020: total bilirubin 1.1, direct bilirubin 0.60, alkaline phosphatase 218, AST 65, ALT 74. IgG 1990.-Labs 9/8/2020: IgG 2604. WBC 9.9, hemoglobin 14.2, platelet 364.-Lab 8/28/2020: WBC 11.7, hemoglobin 13.3, platelet 208, total bilirubin 1.7, alkaline phosphatase 168, AST 95, . H pyloti ab neg. -Labs 8/17/2020: IgG 2102 H, ceruloplasmin 24.7, hep B surface antibody positive, anti-smooth muscle antibody 54 H, anti-mitochondrial antibody negative, hep A IgM negative, alpha-1 antitrypsin 135 MZ, ANCA panel negative, EDUARDO negative, hep B core antibody negative, hep B surface antigen negative, hep C antibody negative,-Labs 7/29/2020: Total bilirubin 3.6, alkaline phosphatase 254, , , total protein 6.8, albumin 3.0, creatinine 0.91, BUN 17, hereditary hemochromatosis negative.-Labs 7/28/2020: pancreatic elastase, fecal 315 normal.-Labs 7/27/2020: WBC 5.6, hemoglobin 14.1, platelet 242, fecal pancreatic elastase 315 normal, ferritin 4121, vitamin B12 higher >2000, iron saturation 91%.-Labs 1/8/2019: total protein 6.2, albumin 3.5, total bilirubin 0.6, alkaline phosphatase 122, AST 38 H, ALT 61 H.-Labs 1/9/2019 total bilirubin 0.6, alkaline phosphatase 110, AST 25, ALT 36.IMAGES:-CT abdomen and pelvis with contrast 8/26/2020: status post gastric bypass. Small hiatal hernia. Pneumoperitoneum consistent with the bowel perforation. Sigmoid diverticulosis. Abdominal ascites. Normal pancreas. Normal spleen with normal size. Normal liver.-Abdominal ultrasound 8/21/2020: No liver mass. Normal biliary duct. Patent portal vein. Status post cholecystectomy. Normal spleen, 12 cm. Normal pancreas. No ascites.LIVER BIOPSY:-Pathology liver wedge biopsy 8/31/2020: worrisome for cirrhosis. Nodular architecture and chronic portal inflammatory infiltrate predominantly lymphocytes with plasma cells. No significant lobular or interface activity. No significant the steatosis were ballooning degeneration. No granulomas. Bridging fibrosis. Mild increase in iron stores.&

## 2020-11-02 NOTE — HPI-TODAY'S VISIT:
70-year-old  female who presents for follow-up of perforated marginal ulcer and autoimmune hepatitis.  EGD and colonoscopy done which showed healthy anastomosis at GJ and JJ junction.  Negative H. pylori.  Colonoscopy revealed a subcentimeter tubular adenoma.  Overall she is doing well.  She reports oily stools 3-6 times a day despite  Creon 2 capsules with each meal. Her liver enzymes further improved.  She is on prednisone 20 mg currently.  Azathioprine 50 mg daily.

## 2020-11-06 ENCOUNTER — WEB ENCOUNTER (OUTPATIENT)
Dept: URBAN - METROPOLITAN AREA CLINIC 23 | Facility: CLINIC | Age: 70
End: 2020-11-06

## 2020-11-09 ENCOUNTER — WEB ENCOUNTER (OUTPATIENT)
Dept: URBAN - METROPOLITAN AREA CLINIC 23 | Facility: CLINIC | Age: 70
End: 2020-11-09

## 2020-11-09 RX ORDER — PANCRELIPASE 36000; 180000; 114000 [USP'U]/1; [USP'U]/1; [USP'U]/1
AS DIRECTED CAPSULE, DELAYED RELEASE PELLETS ORAL
Qty: 1000 CAPSULE | Refills: 0

## 2020-11-11 ENCOUNTER — WEB ENCOUNTER (OUTPATIENT)
Dept: URBAN - METROPOLITAN AREA CLINIC 23 | Facility: CLINIC | Age: 70
End: 2020-11-11

## 2020-11-11 ENCOUNTER — TELEPHONE ENCOUNTER (OUTPATIENT)
Dept: URBAN - METROPOLITAN AREA CLINIC 77 | Facility: CLINIC | Age: 70
End: 2020-11-11

## 2020-11-17 ENCOUNTER — LAB OUTSIDE AN ENCOUNTER (OUTPATIENT)
Dept: URBAN - METROPOLITAN AREA CLINIC 23 | Facility: CLINIC | Age: 70
End: 2020-11-17

## 2020-11-17 ENCOUNTER — TELEPHONE ENCOUNTER (OUTPATIENT)
Dept: URBAN - METROPOLITAN AREA CLINIC 23 | Facility: CLINIC | Age: 70
End: 2020-11-17

## 2020-11-17 LAB
A/G RATIO: 1.2
ALBUMIN: 3.1
ALKALINE PHOSPHATASE: 124
ALT (SGPT): 38
AST (SGOT): 40
BILIRUBIN, TOTAL: 0.6
BUN/CREATININE RATIO: 17
BUN: 15
CALCIUM: 8.7
CARBON DIOXIDE, TOTAL: 24
CHLORIDE: 107
CREATININE: 0.88
EGFR IF AFRICN AM: 76
EGFR IF NONAFRICN AM: 66
GLOBULIN, TOTAL: 2.6
GLUCOSE: 97
HEMATOCRIT: 43.7
HEMOGLOBIN: 14.7
IGG, IMMUNOGLOBULIN G (RDL): 1016
MCH: 34.8
MCHC: 33.6
MCV: 103
NRBC: (no result)
PLATELETS: 206
POTASSIUM: 4.2
PROTEIN, TOTAL: 5.7
RBC: 4.23
RDW: 13.9
SODIUM: 141
WBC: 9.2

## 2020-11-22 ENCOUNTER — WEB ENCOUNTER (OUTPATIENT)
Dept: URBAN - METROPOLITAN AREA CLINIC 23 | Facility: CLINIC | Age: 70
End: 2020-11-22

## 2020-11-22 RX ORDER — AZATHIOPRINE 50 MG/1
1 TAB TABLET ORAL ONCE DAILY
Qty: 90 | Refills: 3
Start: 2020-09-08 | End: 2021-09-03

## 2020-11-26 ENCOUNTER — WEB ENCOUNTER (OUTPATIENT)
Dept: URBAN - METROPOLITAN AREA CLINIC 23 | Facility: CLINIC | Age: 70
End: 2020-11-26

## 2020-11-26 LAB
A/G RATIO: 1.4
ALBUMIN: 3.3
ALKALINE PHOSPHATASE: 112
ALT (SGPT): 40
AST (SGOT): 52
BILIRUBIN, TOTAL: 0.6
BUN/CREATININE RATIO: 20
BUN: 15
CALCIUM: 8.3
CARBON DIOXIDE, TOTAL: 25
CHLORIDE: 110
CREATININE: 0.75
EGFR IF AFRICN AM: 92
EGFR IF NONAFRICN AM: 80
GLOBULIN, TOTAL: 2.4
GLUCOSE: 110
HEMATOCRIT: 40.6
HEMOGLOBIN: 14.1
MCH: 35.3
MCHC: 34.7
MCV: 102
NRBC: (no result)
PLATELETS: 155
POTASSIUM: 4.3
PROTEIN, TOTAL: 5.7
RBC: 3.99
RDW: 13.6
SODIUM: 144
WBC: 8

## 2020-12-01 ENCOUNTER — WEB ENCOUNTER (OUTPATIENT)
Dept: URBAN - METROPOLITAN AREA CLINIC 23 | Facility: CLINIC | Age: 70
End: 2020-12-01

## 2020-12-04 ENCOUNTER — WEB ENCOUNTER (OUTPATIENT)
Dept: URBAN - METROPOLITAN AREA CLINIC 23 | Facility: CLINIC | Age: 70
End: 2020-12-04

## 2020-12-09 LAB
A/G RATIO: 1.3
ALBUMIN: 3.4
ALKALINE PHOSPHATASE: 131
ALT (SGPT): 47
AST (SGOT): 59
BILIRUBIN, TOTAL: 0.5
BUN/CREATININE RATIO: 13
BUN: 10
CALCIUM: 8.2
CARBON DIOXIDE, TOTAL: 21
CHLORIDE: 108
CREATININE: 0.75
EGFR IF AFRICN AM: 92
EGFR IF NONAFRICN AM: 80
GLOBULIN, TOTAL: 2.6
GLUCOSE: 75
POTASSIUM: 3.7
PROTEIN, TOTAL: 6
SODIUM: 144

## 2020-12-10 PROBLEM — 428283002: Status: ACTIVE | Noted: 2020-10-04

## 2020-12-13 ENCOUNTER — WEB ENCOUNTER (OUTPATIENT)
Dept: URBAN - METROPOLITAN AREA CLINIC 23 | Facility: CLINIC | Age: 70
End: 2020-12-13

## 2020-12-13 RX ORDER — PANCRELIPASE 36000; 180000; 114000 [USP'U]/1; [USP'U]/1; [USP'U]/1
AS DIRECTED CAPSULE, DELAYED RELEASE PELLETS ORAL
Qty: 1000 CAPSULE | Refills: 0 | Status: ACTIVE | COMMUNITY

## 2020-12-13 RX ORDER — THIAMINE HCL 100 MG
1 TABLET TABLET ORAL ONCE A DAY
Status: ACTIVE | COMMUNITY

## 2020-12-13 RX ORDER — METRONIDAZOLE 500 MG/1
1 TABLET TABLET, FILM COATED ORAL THREE TIMES A DAY
Qty: 42 TABLET | Refills: 0 | OUTPATIENT
Start: 2020-12-14 | End: 2020-12-28

## 2020-12-13 RX ORDER — LIFITEGRAST 50 MG/ML
1 DROP INTO AFFECTED EYE SOLUTION/ DROPS OPHTHALMIC TWICE A DAY
Status: ACTIVE | COMMUNITY

## 2020-12-13 RX ORDER — BRIMONIDINE TARTRATE, TIMOLOL MALEATE 2; 5 MG/ML; MG/ML
1 DROP INTO AFFECTED EYE SOLUTION/ DROPS OPHTHALMIC TWICE A DAY
Status: ACTIVE | COMMUNITY

## 2020-12-13 RX ORDER — FUROSEMIDE 20 MG/1
1 TABLET TABLET ORAL ONCE A DAY
Qty: 7 TABLET | Refills: 1 | Status: ACTIVE | COMMUNITY
Start: 2020-09-08

## 2020-12-13 RX ORDER — AZATHIOPRINE 50 MG/1
1 TAB TABLET ORAL ONCE DAILY
Qty: 90 | Refills: 3 | Status: ACTIVE | COMMUNITY
Start: 2020-09-08 | End: 2021-09-03

## 2020-12-13 RX ORDER — SODIUM, POTASSIUM,MAG SULFATES 17.5-3.13G
17.5-13.3-1.6 GM/177ML SOLUTION, RECONSTITUTED, ORAL ORAL AS DIRECTED
Qty: 354 MILLILITER | Status: ACTIVE | COMMUNITY
Start: 2020-07-27

## 2020-12-13 RX ORDER — LEVETIRACETAM 500 MG
1 TABLET TABLET ORAL TWICE A DAY
Status: ACTIVE | COMMUNITY

## 2020-12-13 RX ORDER — DENOSUMAB 60 MG/ML
AS DIRECTED INJECTION SUBCUTANEOUS
Status: ACTIVE | COMMUNITY

## 2020-12-15 ENCOUNTER — WEB ENCOUNTER (OUTPATIENT)
Dept: URBAN - METROPOLITAN AREA CLINIC 23 | Facility: CLINIC | Age: 70
End: 2020-12-15

## 2020-12-15 RX ORDER — AZATHIOPRINE 75 1/1
AS DIRECTED TABLET ORAL QD
Qty: 90 | Refills: 1 | OUTPATIENT

## 2020-12-15 RX ORDER — PREDNISONE 5 MG/1
3 TABLETS TABLET ORAL ONCE A DAY
Qty: 270 TABLET | Refills: 1 | OUTPATIENT
Start: 2020-12-18 | End: 2021-06-16

## 2020-12-21 ENCOUNTER — OFFICE VISIT (OUTPATIENT)
Dept: URBAN - METROPOLITAN AREA CLINIC 23 | Facility: CLINIC | Age: 70
End: 2020-12-21
Payer: MEDICARE

## 2020-12-21 DIAGNOSIS — K86.81 EXOCRINE PANCREATIC INSUFFICIENCY: ICD-10-CM

## 2020-12-21 DIAGNOSIS — K75.81 NASH (NONALCOHOLIC STEATOHEPATITIS): ICD-10-CM

## 2020-12-21 DIAGNOSIS — K75.4 AUTOIMMUNE HEPATITIS: ICD-10-CM

## 2020-12-21 DIAGNOSIS — R14.0 ABDOMINAL BLOATING: ICD-10-CM

## 2020-12-21 PROCEDURE — G9903 PT SCRN TBCO ID AS NON USER: HCPCS | Performed by: INTERNAL MEDICINE

## 2020-12-21 PROCEDURE — G8420 CALC BMI NORM PARAMETERS: HCPCS | Performed by: INTERNAL MEDICINE

## 2020-12-21 PROCEDURE — G8427 DOCREV CUR MEDS BY ELIG CLIN: HCPCS | Performed by: INTERNAL MEDICINE

## 2020-12-21 PROCEDURE — 99214 OFFICE O/P EST MOD 30 MIN: CPT | Performed by: INTERNAL MEDICINE

## 2020-12-21 PROCEDURE — 3017F COLORECTAL CA SCREEN DOC REV: CPT | Performed by: INTERNAL MEDICINE

## 2020-12-21 PROCEDURE — G9622 NO UNHEAL ETOH USER: HCPCS | Performed by: INTERNAL MEDICINE

## 2020-12-21 PROCEDURE — 1036F TOBACCO NON-USER: CPT | Performed by: INTERNAL MEDICINE

## 2020-12-21 RX ORDER — METRONIDAZOLE 500 MG/1
1 TABLET TABLET, FILM COATED ORAL THREE TIMES A DAY
Qty: 42 TABLET | Refills: 0 | Status: ACTIVE | COMMUNITY
Start: 2020-12-14 | End: 2020-12-28

## 2020-12-21 RX ORDER — PREDNISONE 5 MG/1
3 TABLETS TABLET ORAL ONCE A DAY
Qty: 270 TABLET | Refills: 1 | Status: ACTIVE | COMMUNITY
Start: 2020-12-18 | End: 2021-06-16

## 2020-12-21 RX ORDER — PANCRELIPASE 36000; 180000; 114000 [USP'U]/1; [USP'U]/1; [USP'U]/1
AS DIRECTED CAPSULE, DELAYED RELEASE PELLETS ORAL
Qty: 1000 CAPSULE | Refills: 0 | Status: ACTIVE | COMMUNITY

## 2020-12-21 RX ORDER — FUROSEMIDE 20 MG/1
1 TABLET TABLET ORAL ONCE A DAY
Qty: 7 TABLET | Refills: 1 | Status: ACTIVE | COMMUNITY
Start: 2020-09-08

## 2020-12-21 RX ORDER — AZATHIOPRINE 75 1/1
AS DIRECTED TABLET ORAL QD
Qty: 90 | Refills: 1 | Status: ACTIVE | COMMUNITY

## 2020-12-21 RX ORDER — BRIMONIDINE TARTRATE, TIMOLOL MALEATE 2; 5 MG/ML; MG/ML
1 DROP INTO AFFECTED EYE SOLUTION/ DROPS OPHTHALMIC TWICE A DAY
Status: ACTIVE | COMMUNITY

## 2020-12-21 RX ORDER — THIAMINE HCL 100 MG
1 TABLET TABLET ORAL ONCE A DAY
Status: ACTIVE | COMMUNITY

## 2020-12-21 RX ORDER — LIFITEGRAST 50 MG/ML
1 DROP INTO AFFECTED EYE SOLUTION/ DROPS OPHTHALMIC TWICE A DAY
Status: ACTIVE | COMMUNITY

## 2020-12-21 RX ORDER — DENOSUMAB 60 MG/ML
AS DIRECTED INJECTION SUBCUTANEOUS
Status: ACTIVE | COMMUNITY

## 2020-12-21 RX ORDER — LEVETIRACETAM 500 MG
1 TABLET TABLET ORAL TWICE A DAY
Status: ACTIVE | COMMUNITY

## 2020-12-21 NOTE — PREVIOUS WORKUP REVIEWED
:ENDOSCOPIES:-Push enteroscopy 10/20/2020: down to JJ anastomosis. Healthy GJ and JJ anastomosis. Normal exam of the esophagus and the gastric pouch.-Colonoscopy 10/20/2020: hemorrhoids. Normal TI. A 3 mm polyp in the ascending colon. Diverticulosis in the sigmoid colon. Random colon biopsy taken to reroute microscopic colitis. A scar at the ileocecal valve.*Pathology: random gastric-minimal chronic inflammation, negative H. pylori. Ascending colon polyp-tubular adenoma. Random colon biopsy-normal.LABS: -Labs 12/8/2020: BUN 10, creatinine 0.75, total protein 6, albumin 3.4, total bilirubin 0.5, alkaline phosphatase 131, AST 59, ALT 47-Labs 11/25/2020: WBC 8.0, hemoglobin 14.1, platelets 155. Total bilirubin 0.6, alkaline phosphatase 112, AST 52, ALT 40.-Labs 11/12/2020: IgG 1016. WBC 9.2, hemoglobin 14.7, platelet 206. Total bilirubin 0.6, alkaline phosphatase 124, AST 40, ALT 38.-Labs 10/28/2020: WBC 10.7, hemoglobin 14.1, platelet 210, total bilirubin 0.5, alkaline phosphatase 118, AST 38, ALT 40.-Labs 10/14/2020: total bilirubin 0.8, alkaline phosphatase 139, AST 47, ALT 52.-Labs 9/30/2020: total bilirubin 0.8, alkaline phosphatase 149, AST 48, ALT 56. WBC 10.0, hemoglobin 13.9,, platelet 130, IgG 1241.-Labs 9/23/2020: TPMT enzyme activity normal. total bilirubin 0.9, alkaline phosphatase 166, AST 51, ALT 63. IgG 1590.-Labs 9/16/2020: total bilirubin 1.1, direct bilirubin 0.60, alkaline phosphatase 218, AST 65, ALT 74. IgG 1990.-Labs 9/8/2020: IgG 2604. WBC 9.9, hemoglobin 14.2, platelet 364.-Lab 8/28/2020: WBC 11.7, hemoglobin 13.3, platelet 208, total bilirubin 1.7, alkaline phosphatase 168, AST 95, . H pyloti ab neg. -Labs 8/17/2020: IgG 2102 H, ceruloplasmin 24.7, hep B surface antibody positive, anti-smooth muscle antibody 54 H, anti-mitochondrial antibody negative, hep A IgM negative, alpha-1 antitrypsin 135 MZ, ANCA panel negative, EDUARDO negative, hep B core antibody negative, hep B surface antigen negative, hep C antibody negative,-Labs 7/29/2020: Total bilirubin 3.6, alkaline phosphatase 254, , , total protein 6.8, albumin 3.0, creatinine 0.91, BUN 17, hereditary hemochromatosis negative.-Labs 7/28/2020: pancreatic elastase, fecal 315 normal.-Labs 7/27/2020: WBC 5.6, hemoglobin 14.1, platelet 242, fecal pancreatic elastase 315 normal, ferritin 4121, vitamin B12 higher >2000, iron saturation 91%.-Labs 1/8/2019: total protein 6.2, albumin 3.5, total bilirubin 0.6, alkaline phosphatase 122, AST 38 H, ALT 61 H.-Labs 1/9/2019 total bilirubin 0.6, alkaline phosphatase 110, AST 25, ALT 36.IMAGES:-CT abdomen and pelvis with contrast 8/26/2020: status post gastric bypass. Small hiatal hernia. Pneumoperitoneum consistent with the bowel perforation. Sigmoid diverticulosis. Abdominal ascites. Normal pancreas. Normal spleen with normal size. Normal liver.-Abdominal ultrasound 8/21/2020: No liver mass. Normal biliary duct. Patent portal vein. Status post cholecystectomy. Normal spleen, 12 cm. Normal pancreas. No ascites.LIVER BIOPSY:-Pathology liver wedge biopsy 8/31/2020: worrisome for cirrhosis. Nodular architecture and chronic portal inflammatory infiltrate predominantly lymphocytes with plasma cells. No significant lobular or interface activity. No significant the steatosis were ballooning degeneration. No granulomas. Bridging fibrosis. Mild increase in iron stores.&

## 2020-12-21 NOTE — HPI-TODAY'S VISIT:
71-year-old  female with autoimmune hepatitis and perforated marginal ulcer presents for follow-up.  Today she complains significant bloating, gas, burping postprandially.  Currently she takes Creon 2-3 caps with meals.  Pantoprazole twice a day.  Flagyl was prescribed by me a week ago, she thinks that helps.  She has a week left.  Her liver enzymes backed up mildly with tapering prednisone down to 10 mg and azathioprine 50 mg at the time.  Currently she is on prednisone 15 mg and azathioprine 75 mg.

## 2020-12-22 ENCOUNTER — WEB ENCOUNTER (OUTPATIENT)
Dept: URBAN - METROPOLITAN AREA CLINIC 23 | Facility: CLINIC | Age: 70
End: 2020-12-22

## 2021-01-12 PROBLEM — 429047008: Status: ACTIVE | Noted: 2021-01-12

## 2021-01-13 ENCOUNTER — TELEPHONE ENCOUNTER (OUTPATIENT)
Dept: URBAN - METROPOLITAN AREA CLINIC 23 | Facility: CLINIC | Age: 71
End: 2021-01-13

## 2021-01-13 ENCOUNTER — WEB ENCOUNTER (OUTPATIENT)
Dept: URBAN - METROPOLITAN AREA CLINIC 23 | Facility: CLINIC | Age: 71
End: 2021-01-13

## 2021-01-13 LAB
A/G RATIO: 1.6
ALBUMIN: 3.5
ALKALINE PHOSPHATASE: 122
ALT (SGPT): 48
AST (SGOT): 48
BILIRUBIN, TOTAL: 0.5
BUN/CREATININE RATIO: 23
BUN: 18
CALCIUM: 8.9
CARBON DIOXIDE, TOTAL: 23
CHLORIDE: 107
CREATININE: 0.78
EGFR IF AFRICN AM: 88
EGFR IF NONAFRICN AM: 76
GLOBULIN, TOTAL: 2.2
GLUCOSE: 70
HEMATOCRIT: 41.7
HEMOGLOBIN: 14
MCH: 34.5
MCHC: 33.6
MCV: 103
NRBC: (no result)
PLATELETS: 135
POTASSIUM: 3.9
PROTEIN, TOTAL: 5.7
RBC: 4.06
RDW: 13.1
SODIUM: 141
WBC: 8.1

## 2021-02-01 ENCOUNTER — OFFICE VISIT (OUTPATIENT)
Dept: URBAN - METROPOLITAN AREA CLINIC 23 | Facility: CLINIC | Age: 71
End: 2021-02-01
Payer: MEDICARE

## 2021-02-01 DIAGNOSIS — K75.4 AUTOIMMUNE HEPATITIS: ICD-10-CM

## 2021-02-01 DIAGNOSIS — K86.81 EXOCRINE PANCREATIC INSUFFICIENCY: ICD-10-CM

## 2021-02-01 DIAGNOSIS — K63.89 SMALL INTESTINAL BACTERIAL OVERGROWTH (SIBO): ICD-10-CM

## 2021-02-01 DIAGNOSIS — K58.0 IRRITABLE BOWEL SYNDROME WITH DIARRHEA: ICD-10-CM

## 2021-02-01 PROCEDURE — G8417 CALC BMI ABV UP PARAM F/U: HCPCS | Performed by: INTERNAL MEDICINE

## 2021-02-01 PROCEDURE — G9903 PT SCRN TBCO ID AS NON USER: HCPCS | Performed by: INTERNAL MEDICINE

## 2021-02-01 PROCEDURE — 3017F COLORECTAL CA SCREEN DOC REV: CPT | Performed by: INTERNAL MEDICINE

## 2021-02-01 PROCEDURE — G9622 NO UNHEAL ETOH USER: HCPCS | Performed by: INTERNAL MEDICINE

## 2021-02-01 PROCEDURE — 99214 OFFICE O/P EST MOD 30 MIN: CPT | Performed by: INTERNAL MEDICINE

## 2021-02-01 PROCEDURE — G8427 DOCREV CUR MEDS BY ELIG CLIN: HCPCS | Performed by: INTERNAL MEDICINE

## 2021-02-01 PROCEDURE — 1036F TOBACCO NON-USER: CPT | Performed by: INTERNAL MEDICINE

## 2021-02-01 RX ORDER — PANCRELIPASE 36000; 180000; 114000 [USP'U]/1; [USP'U]/1; [USP'U]/1
AS DIRECTED CAPSULE, DELAYED RELEASE PELLETS ORAL
Qty: 1000 CAPSULE | Refills: 0 | Status: ACTIVE | COMMUNITY

## 2021-02-01 RX ORDER — LIFITEGRAST 50 MG/ML
1 DROP INTO AFFECTED EYE SOLUTION/ DROPS OPHTHALMIC TWICE A DAY
Status: ACTIVE | COMMUNITY

## 2021-02-01 RX ORDER — THIAMINE HCL 100 MG
1 TABLET TABLET ORAL ONCE A DAY
Status: ACTIVE | COMMUNITY

## 2021-02-01 RX ORDER — LEVETIRACETAM 500 MG
1 TABLET TABLET ORAL TWICE A DAY
Status: ACTIVE | COMMUNITY

## 2021-02-01 RX ORDER — FUROSEMIDE 20 MG/1
1 TABLET TABLET ORAL ONCE A DAY
Qty: 7 TABLET | Refills: 1 | Status: ON HOLD | COMMUNITY
Start: 2020-09-08

## 2021-02-01 RX ORDER — PREDNISONE 10 MG/1
1 TABLET TABLET ORAL ONCE A DAY
Qty: 90 TABLET | Refills: 1 | Status: ACTIVE | COMMUNITY
Start: 2020-12-18 | End: 2021-06-16

## 2021-02-01 RX ORDER — RIFAXIMIN 550 MG/1
1 TABLET TABLET ORAL THREE TIMES A DAY
Qty: 42 TABLET | Refills: 0 | OUTPATIENT
Start: 2021-02-01 | End: 2021-02-15

## 2021-02-01 RX ORDER — AZATHIOPRINE 75 1/1
AS DIRECTED TABLET ORAL QD
Qty: 90 | Refills: 1 | Status: ACTIVE | COMMUNITY

## 2021-02-01 RX ORDER — DENOSUMAB 60 MG/ML
AS DIRECTED INJECTION SUBCUTANEOUS
Status: ACTIVE | COMMUNITY

## 2021-02-01 RX ORDER — GABAPENTIN 300 MG/1
1 CAPSULE CAPSULE ORAL ONCE A DAY
Status: ACTIVE | COMMUNITY

## 2021-02-01 RX ORDER — BRIMONIDINE TARTRATE, TIMOLOL MALEATE 2; 5 MG/ML; MG/ML
1 DROP INTO AFFECTED EYE SOLUTION/ DROPS OPHTHALMIC TWICE A DAY
Status: ACTIVE | COMMUNITY

## 2021-02-01 NOTE — PREVIOUS WORKUP REVIEWED
:, :ENDOSCOPIES:-Push enteroscopy 10/20/2020: down to JJ anastomosis. Healthy GJ and JJ anastomosis. Normal exam of the esophagus and the gastric pouch. -Colonoscopy 10/20/2020: hemorrhoids. Normal TI. A 3 mm polyp in the ascending colon. Diverticulosis in the sigmoid colon. Random colon biopsy taken to rule out microscopic colitis. A scar at the ileocecal valve.*Pathology: random gastric-minimal chronic inflammation, negative H. pylori. Ascending colon polyp-tubular adenoma. Random colon biopsy-normal.LABS: -Labs 02/01/2021: BUN 18, creatinine 0.82, total bilirubin 0.5, alkaline phosphatase 102, AST 48 H, ALT 44 H, WBC 9.0, hemoglobin 15.1, platelet 145. IgG 874.-Labs 01/12/2021: total bilirubin 0.5, alkaline phosphatase 122, AST 48, ALT 48, WBC 8.1, hemoglobin 14, platelets 135.-Labs 12/8/2020: BUN 10, creatinine 0.75, total protein 6, albumin 3.4, total bilirubin 0.5, alkaline phosphatase 131, AST 59, ALT 47-Labs 11/25/2020: WBC 8.0, hemoglobin 14.1, platelets 155. Total bilirubin 0.6, alkaline phosphatase 112, AST 52, ALT 40.-Labs 11/12/2020: IgG 1016. WBC 9.2, hemoglobin 14.7, platelet 206. Total bilirubin 0.6, alkaline phosphatase 124, AST 40, ALT 38.-Labs 10/28/2020: WBC 10.7, hemoglobin 14.1, platelet 210, total bilirubin 0.5, alkaline phosphatase 118, AST 38, ALT 40.-Labs 10/14/2020: total bilirubin 0.8, alkaline phosphatase 139, AST 47, ALT 52.-Labs 9/30/2020: total bilirubin 0.8, alkaline phosphatase 149, AST 48, ALT 56. WBC 10.0, hemoglobin 13.9,, platelet 130, IgG 1241.-Labs 9/23/2020: TPMT enzyme activity normal. total bilirubin 0.9, alkaline phosphatase 166, AST 51, ALT 63. IgG 1590.-Labs 9/16/2020: total bilirubin 1.1, direct bilirubin 0.60, alkaline phosphatase 218, AST 65, ALT 74. IgG 1990.-Labs 9/8/2020: IgG 2604. WBC 9.9, hemoglobin 14.2, platelet 364.-Lab 8/28/2020: WBC 11.7, hemoglobin 13.3, platelet 208, total bilirubin 1.7, alkaline phosphatase 168, AST 95, . H pyloti ab neg. -Labs 8/17/2020: IgG 2102 H, ceruloplasmin 24.7, hep B surface antibody positive, anti-smooth muscle antibody 54 H, anti-mitochondrial antibody negative, hep A IgM negative, alpha-1 antitrypsin 135 MZ, ANCA panel negative, EDUARDO negative, hep B core antibody negative, hep B surface antigen negative, hep C antibody negative,-Labs 7/29/2020: Total bilirubin 3.6, alkaline phosphatase 254, , , total protein 6.8, albumin 3.0, creatinine 0.91, BUN 17, hereditary hemochromatosis negative.-Labs 7/28/2020: pancreatic elastase, fecal 315 normal.-Labs 7/27/2020: WBC 5.6, hemoglobin 14.1, platelet 242, fecal pancreatic elastase 315 normal, ferritin 4121, vitamin B12 higher >2000, iron saturation 91%.-Labs 1/8/2019: total protein 6.2, albumin 3.5, total bilirubin 0.6, alkaline phosphatase 122, AST 38 H, ALT 61 H.-Labs 1/9/2019 total bilirubin 0.6, alkaline phosphatase 110, AST 25, ALT 36.IMAGES:-CT abdomen and pelvis with contrast 8/26/2020: status post gastric bypass. Small hiatal hernia. Pneumoperitoneum consistent with the bowel perforation. Sigmoid diverticulosis. Abdominal ascites. Normal pancreas. Normal spleen with normal size. Normal liver.-Abdominal ultrasound 8/21/2020: No liver mass. Normal biliary duct. Patent portal vein. Status post cholecystectomy. Normal spleen, 12 cm. Normal pancreas. No ascites.LIVER BIOPSY:-Pathology liver wedge biopsy 8/31/2020: worrisome for cirrhosis. Nodular architecture and chronic portal inflammatory infiltrate predominantly lymphocytes with plasma cells. No significant lobular or interface activity. No significant the steatosis were ballooning degeneration. No granulomas. Bridging fibrosis. Mild increase in iron stores.&

## 2021-02-01 NOTE — HPI-TODAY'S VISIT:
She presents for follow-up.  Her excessive flatus has gotten better with Flagyl treatment.  However still she has more than normal excessive gas.  Some days are good some days are bad.  She is following Dr. Loomis about hyper parathyroidism.  Taking high-dose vitamin D and calcium.  She has been on prednisone 10 mg and azathioprine 75 mg daily.  Tolerated well. She had Covid vaccine first dose, scheduled for second dose.

## 2021-02-04 ENCOUNTER — LAB OUTSIDE AN ENCOUNTER (OUTPATIENT)
Dept: URBAN - METROPOLITAN AREA CLINIC 23 | Facility: CLINIC | Age: 71
End: 2021-02-04

## 2021-02-04 ENCOUNTER — WEB ENCOUNTER (OUTPATIENT)
Dept: URBAN - METROPOLITAN AREA CLINIC 23 | Facility: CLINIC | Age: 71
End: 2021-02-04

## 2021-02-04 ENCOUNTER — TELEPHONE ENCOUNTER (OUTPATIENT)
Dept: URBAN - METROPOLITAN AREA CLINIC 23 | Facility: CLINIC | Age: 71
End: 2021-02-04

## 2021-02-04 LAB
A/G RATIO: 1.6
ALBUMIN: 4
ALKALINE PHOSPHATASE: 102
ALT (SGPT): 44
AST (SGOT): 48
BILIRUBIN, TOTAL: 0.5
BUN/CREATININE RATIO: 22
BUN: 18
CALCIUM: 8.9
CARBON DIOXIDE, TOTAL: 20
CHLORIDE: 106
CREATININE: 0.82
EGFR IF AFRICN AM: 82
EGFR IF NONAFRICN AM: 71
GLOBULIN, TOTAL: 2.5
GLUCOSE: 104
HEMATOCRIT: 44.4
HEMOGLOBIN: 15.1
IGG, IMMUNOGLOBULIN G (RDL): 874
MCH: 34.6
MCHC: 34
MCV: 102
NRBC: (no result)
PLATELETS: 145
POTASSIUM: 4.2
PROTEIN, TOTAL: 6.5
RBC: 4.37
RDW: 13.2
SODIUM: 140
WBC: 9

## 2021-02-27 PROBLEM — 197125005: Status: ACTIVE | Noted: 2021-02-01

## 2021-03-02 ENCOUNTER — WEB ENCOUNTER (OUTPATIENT)
Dept: URBAN - METROPOLITAN AREA CLINIC 23 | Facility: CLINIC | Age: 71
End: 2021-03-02

## 2021-03-02 RX ORDER — DICYCLOMINE HYDROCHLORIDE 20 MG/1
1 TABLET TABLET ORAL THREE TIMES A DAY
Qty: 42 TABLET | Refills: 0 | OUTPATIENT
Start: 2021-03-03 | End: 2021-03-17

## 2021-03-02 RX ORDER — METRONIDAZOLE 500 MG/1
1 TABLET TABLET, FILM COATED ORAL THREE TIMES A DAY
Qty: 42 TABLET | Refills: 0 | OUTPATIENT
Start: 2021-03-03 | End: 2021-03-17

## 2021-03-11 ENCOUNTER — WEB ENCOUNTER (OUTPATIENT)
Dept: URBAN - METROPOLITAN AREA CLINIC 23 | Facility: CLINIC | Age: 71
End: 2021-03-11

## 2021-03-17 LAB
A/G RATIO: 1.6
ALBUMIN: 3.8
ALKALINE PHOSPHATASE: 77
ALT (SGPT): 30
AST (SGOT): 42
BILIRUBIN, TOTAL: 0.4
BUN/CREATININE RATIO: 17
BUN: 13
CALCIUM: 9.6
CARBON DIOXIDE, TOTAL: 24
CHLORIDE: 109
CREATININE: 0.78
EGFR IF AFRICN AM: 87
EGFR IF NONAFRICN AM: 76
GLOBULIN, TOTAL: 2.4
GLUCOSE: 82
HEMATOCRIT: 42
HEMOGLOBIN: 14.6
MCH: 34.3
MCHC: 34.8
MCV: 99
NRBC: (no result)
PLATELETS: 234
POTASSIUM: 4.3
PROTEIN, TOTAL: 6.2
RBC: 4.26
RDW: 13.3
SODIUM: 146
WBC: 5.4

## 2021-03-23 ENCOUNTER — WEB ENCOUNTER (OUTPATIENT)
Dept: URBAN - METROPOLITAN AREA CLINIC 23 | Facility: CLINIC | Age: 71
End: 2021-03-23

## 2021-03-30 ENCOUNTER — WEB ENCOUNTER (OUTPATIENT)
Dept: URBAN - METROPOLITAN AREA CLINIC 23 | Facility: CLINIC | Age: 71
End: 2021-03-30

## 2021-04-07 ENCOUNTER — WEB ENCOUNTER (OUTPATIENT)
Dept: URBAN - METROPOLITAN AREA CLINIC 23 | Facility: CLINIC | Age: 71
End: 2021-04-07

## 2021-04-22 ENCOUNTER — WEB ENCOUNTER (OUTPATIENT)
Dept: URBAN - METROPOLITAN AREA CLINIC 23 | Facility: CLINIC | Age: 71
End: 2021-04-22

## 2021-04-29 LAB
A/G RATIO: 1.6
ALBUMIN: 3.8
ALKALINE PHOSPHATASE: 99
ALT (SGPT): 25
AST (SGOT): 39
BILIRUBIN, TOTAL: 0.4
BUN/CREATININE RATIO: 20
BUN: 15
CALCIUM: 9
CARBON DIOXIDE, TOTAL: 25
CHLORIDE: 107
CREATININE: 0.75
EGFR IF AFRICN AM: 91
EGFR IF NONAFRICN AM: 79
GLOBULIN, TOTAL: 2.4
GLUCOSE: 69
HEMATOCRIT: 39
HEMOGLOBIN: 13.1
IGG, IMMUNOGLOBULIN G (RDL): 959
MCH: 34.6
MCHC: 33.6
MCV: 103
NRBC: (no result)
PLATELETS: 232
POTASSIUM: 4.7
PROTEIN, TOTAL: 6.2
RBC: 3.79
RDW: 13.1
SODIUM: 145
WBC: 4.6

## 2021-05-03 ENCOUNTER — OFFICE VISIT (OUTPATIENT)
Dept: URBAN - METROPOLITAN AREA CLINIC 23 | Facility: CLINIC | Age: 71
End: 2021-05-03
Payer: MEDICARE

## 2021-05-03 DIAGNOSIS — K75.4 AUTOIMMUNE HEPATITIS: ICD-10-CM

## 2021-05-03 DIAGNOSIS — K63.89 SMALL INTESTINAL BACTERIAL OVERGROWTH (SIBO): ICD-10-CM

## 2021-05-03 DIAGNOSIS — K86.81 EXOCRINE PANCREATIC INSUFFICIENCY: ICD-10-CM

## 2021-05-03 DIAGNOSIS — K75.81 NASH (NONALCOHOLIC STEATOHEPATITIS): ICD-10-CM

## 2021-05-03 PROCEDURE — G8420 CALC BMI NORM PARAMETERS: HCPCS | Performed by: INTERNAL MEDICINE

## 2021-05-03 PROCEDURE — G8427 DOCREV CUR MEDS BY ELIG CLIN: HCPCS | Performed by: INTERNAL MEDICINE

## 2021-05-03 PROCEDURE — 3017F COLORECTAL CA SCREEN DOC REV: CPT | Performed by: INTERNAL MEDICINE

## 2021-05-03 PROCEDURE — G9903 PT SCRN TBCO ID AS NON USER: HCPCS | Performed by: INTERNAL MEDICINE

## 2021-05-03 PROCEDURE — 1036F TOBACCO NON-USER: CPT | Performed by: INTERNAL MEDICINE

## 2021-05-03 PROCEDURE — 99214 OFFICE O/P EST MOD 30 MIN: CPT | Performed by: INTERNAL MEDICINE

## 2021-05-03 PROCEDURE — G9622 NO UNHEAL ETOH USER: HCPCS | Performed by: INTERNAL MEDICINE

## 2021-05-03 RX ORDER — THIAMINE HCL 100 MG
1 TABLET TABLET ORAL ONCE A DAY
Status: ACTIVE | COMMUNITY

## 2021-05-03 RX ORDER — DENOSUMAB 60 MG/ML
AS DIRECTED INJECTION SUBCUTANEOUS
Status: ACTIVE | COMMUNITY

## 2021-05-03 RX ORDER — PANCRELIPASE 36000; 180000; 114000 [USP'U]/1; [USP'U]/1; [USP'U]/1
AS DIRECTED CAPSULE, DELAYED RELEASE PELLETS ORAL
Qty: 1000 CAPSULE | Refills: 0 | Status: ACTIVE | COMMUNITY

## 2021-05-03 RX ORDER — LEVETIRACETAM 500 MG
1 TABLET TABLET ORAL TWICE A DAY
Status: ACTIVE | COMMUNITY

## 2021-05-03 RX ORDER — PREDNISONE 10 MG/1
1 TABLET TABLET ORAL ONCE A DAY
Qty: 90 TABLET | Refills: 1 | Status: ON HOLD | COMMUNITY
Start: 2020-12-18 | End: 2021-06-16

## 2021-05-03 RX ORDER — BRIMONIDINE TARTRATE, TIMOLOL MALEATE 2; 5 MG/ML; MG/ML
1 DROP INTO AFFECTED EYE SOLUTION/ DROPS OPHTHALMIC TWICE A DAY
Status: ACTIVE | COMMUNITY

## 2021-05-03 RX ORDER — DICYCLOMINE HYDROCHLORIDE 20 MG/1
1 TABLET TABLET ORAL
Qty: 90 | Refills: 0 | OUTPATIENT
Start: 2021-05-03 | End: 2021-06-02

## 2021-05-03 RX ORDER — FUROSEMIDE 20 MG/1
1 TABLET TABLET ORAL ONCE A DAY
Qty: 7 TABLET | Refills: 1 | Status: ON HOLD | COMMUNITY
Start: 2020-09-08

## 2021-05-03 RX ORDER — AZATHIOPRINE 75 MG/1
AS DIRECTED TABLET ORAL QD
Qty: 90 | Refills: 1 | Status: ACTIVE | COMMUNITY

## 2021-05-03 RX ORDER — LIFITEGRAST 50 MG/ML
1 DROP INTO AFFECTED EYE SOLUTION/ DROPS OPHTHALMIC TWICE A DAY
Status: ACTIVE | COMMUNITY

## 2021-05-03 RX ORDER — GABAPENTIN 300 MG/1
1 CAPSULE CAPSULE ORAL ONCE A DAY
Status: ACTIVE | COMMUNITY

## 2021-05-03 RX ORDER — METRONIDAZOLE 500 MG/1
1 TABLET TABLET, FILM COATED ORAL THREE TIMES A DAY
Qty: 42 TABLET | Refills: 0 | OUTPATIENT
Start: 2021-05-03 | End: 2021-05-17

## 2021-05-03 NOTE — HPI-TODAY'S VISIT:
71-year-old  female presents for follow-up.  Autoimmune hepatitis is well controlled based on the recent blood work. She has no problem with Imuran 75 mg.  SIBO is reasonably well-controlled after the last round of Bentyl and Flagyl.

## 2021-05-03 NOTE — PREVIOUS WORKUP REVIEWED
. , :, :ENDOSCOPIES:-Push enteroscopy 10/20/2020: down to JJ anastomosis. Healthy GJ and JJ anastomosis. Normal exam of the esophagus and the gastric pouch. -Colonoscopy 10/20/2020: hemorrhoids. Normal TI. A 3 mm polyp in the ascending colon. Diverticulosis in the sigmoid colon. Random colon biopsy taken to rule out microscopic colitis. A scar at the ileocecal valve.*Pathology: random gastric-minimal chronic inflammation, negative H. pylori. Ascending colon polyp-tubular adenoma. Random colon biopsy-normal.LABS: -Labs 4/26/2021: WBC 4.6, hemoglobin 13.1, platelet 232, BUN 15, creatinine 0.75, sodium 145, potassium 4.7, total protein 6.2, albumin 3.8, total bilirubin 0.4, alkaline phosphatase 99, AST 39, ALT 25, IgG 959.-Labs 3/16/2021: WBC 5.4, hemoglobin 14.6, platelet 234, BUN 13, creatinine 0.78, sodium 146, potassium 4.3, total protein 6.2, albumin 3.8, total bilirubin 0.4, alkaline phosphatase 77, AST 42, ALT 30.-Labs 02/01/2021: BUN 18, creatinine 0.82, total bilirubin 0.5, alkaline phosphatase 102, AST 48 H, ALT 44 H, WBC 9.0, hemoglobin 15.1, platelet 145. IgG 874.-Labs 01/12/2021: total bilirubin 0.5, alkaline phosphatase 122, AST 48, ALT 48, WBC 8.1, hemoglobin 14, platelets 135.-Labs 12/8/2020: BUN 10, creatinine 0.75, total protein 6, albumin 3.4, total bilirubin 0.5, alkaline phosphatase 131, AST 59, ALT 47-Labs 11/25/2020: WBC 8.0, hemoglobin 14.1, platelets 155. Total bilirubin 0.6, alkaline phosphatase 112, AST 52, ALT 40.-Labs 11/12/2020: IgG 1016. WBC 9.2, hemoglobin 14.7, platelet 206. Total bilirubin 0.6, alkaline phosphatase 124, AST 40, ALT 38.-Labs 10/28/2020: WBC 10.7, hemoglobin 14.1, platelet 210, total bilirubin 0.5, alkaline phosphatase 118, AST 38, ALT 40.-Labs 10/14/2020: total bilirubin 0.8, alkaline phosphatase 139, AST 47, ALT 52.-Labs 9/30/2020: total bilirubin 0.8, alkaline phosphatase 149, AST 48, ALT 56. WBC 10.0, hemoglobin 13.9,, platelet 130, IgG 1241.-Labs 9/23/2020: TPMT enzyme activity normal. total bilirubin 0.9, alkaline phosphatase 166, AST 51, ALT 63. IgG 1590.-Labs 9/16/2020: total bilirubin 1.1, direct bilirubin 0.60, alkaline phosphatase 218, AST 65, ALT 74. IgG 1990.-Labs 9/8/2020: IgG 2604. WBC 9.9, hemoglobin 14.2, platelet 364.-Lab 8/28/2020: WBC 11.7, hemoglobin 13.3, platelet 208, total bilirubin 1.7, alkaline phosphatase 168, AST 95, . H pyloti ab neg. -Labs 8/17/2020: IgG 2102 H, ceruloplasmin 24.7, hep B surface antibody positive, anti-smooth muscle antibody 54 H, anti-mitochondrial antibody negative, hep A IgM negative, alpha-1 antitrypsin 135 MZ, ANCA panel negative, EDUARDO negative, hep B core antibody negative, hep B surface antigen negative, hep C antibody negative,-Labs 7/29/2020: Total bilirubin 3.6, alkaline phosphatase 254, , , total protein 6.8, albumin 3.0, creatinine 0.91, BUN 17, hereditary hemochromatosis negative.-Labs 7/28/2020: pancreatic elastase, fecal 315 normal.-Labs 7/27/2020: WBC 5.6, hemoglobin 14.1, platelet 242, fecal pancreatic elastase 315 normal, ferritin 4121, vitamin B12 higher >2000, iron saturation 91%.-Labs 1/8/2019: total protein 6.2, albumin 3.5, total bilirubin 0.6, alkaline phosphatase 122, AST 38 H, ALT 61 H.-Labs 1/9/2019 total bilirubin 0.6, alkaline phosphatase 110, AST 25, ALT 36.IMAGES:-CT abdomen and pelvis with contrast 8/26/2020: status post gastric bypass. Small hiatal hernia. Pneumoperitoneum consistent with the bowel perforation. Sigmoid diverticulosis. Abdominal ascites. Normal pancreas. Normal spleen with normal size. Normal liver.-Abdominal ultrasound 8/21/2020: No liver mass. Normal biliary duct. Patent portal vein. Status post cholecystectomy. Normal spleen, 12 cm. Normal pancreas. No ascites.LIVER BIOPSY:-Pathology liver wedge biopsy 8/31/2020: worrisome for cirrhosis. Nodular architecture and chronic portal inflammatory infiltrate predominantly lymphocytes with plasma cells. No significant lobular or interface activity. No significant the steatosis were ballooning degeneration. No granulomas. Bridging fibrosis. Mild increase in iron stores.&

## 2021-05-06 ENCOUNTER — WEB ENCOUNTER (OUTPATIENT)
Dept: URBAN - METROPOLITAN AREA CLINIC 23 | Facility: CLINIC | Age: 71
End: 2021-05-06

## 2021-05-06 RX ORDER — AZATHIOPRINE 75 MG/1
AS DIRECTED TABLET ORAL QD
Qty: 90 | Refills: 3

## 2021-05-08 ENCOUNTER — WEB ENCOUNTER (OUTPATIENT)
Dept: URBAN - METROPOLITAN AREA CLINIC 23 | Facility: CLINIC | Age: 71
End: 2021-05-08

## 2021-05-11 ENCOUNTER — WEB ENCOUNTER (OUTPATIENT)
Dept: URBAN - METROPOLITAN AREA CLINIC 23 | Facility: CLINIC | Age: 71
End: 2021-05-11

## 2021-05-11 RX ORDER — DICYCLOMINE HYDROCHLORIDE 20 MG/1
1 TABLET TABLET ORAL
Qty: 90 | Refills: 0
Start: 2021-05-03 | End: 2021-06-15

## 2021-05-11 RX ORDER — METRONIDAZOLE 500 MG/1
1 TABLET TABLET, FILM COATED ORAL THREE TIMES A DAY
Qty: 42 TABLET | Refills: 0
Start: 2021-05-03 | End: 2021-05-30

## 2021-05-13 ENCOUNTER — TELEPHONE ENCOUNTER (OUTPATIENT)
Dept: URBAN - METROPOLITAN AREA CLINIC 23 | Facility: CLINIC | Age: 71
End: 2021-05-13

## 2021-05-16 ENCOUNTER — WEB ENCOUNTER (OUTPATIENT)
Dept: URBAN - METROPOLITAN AREA CLINIC 23 | Facility: CLINIC | Age: 71
End: 2021-05-16

## 2021-05-17 ENCOUNTER — WEB ENCOUNTER (OUTPATIENT)
Dept: URBAN - METROPOLITAN AREA CLINIC 23 | Facility: CLINIC | Age: 71
End: 2021-05-17

## 2021-05-24 ENCOUNTER — TELEPHONE ENCOUNTER (OUTPATIENT)
Dept: URBAN - METROPOLITAN AREA CLINIC 6 | Facility: CLINIC | Age: 71
End: 2021-05-24

## 2021-06-12 ENCOUNTER — WEB ENCOUNTER (OUTPATIENT)
Dept: URBAN - METROPOLITAN AREA CLINIC 23 | Facility: CLINIC | Age: 71
End: 2021-06-12

## 2021-07-12 ENCOUNTER — WEB ENCOUNTER (OUTPATIENT)
Dept: URBAN - METROPOLITAN AREA CLINIC 23 | Facility: CLINIC | Age: 71
End: 2021-07-12

## 2021-07-12 RX ORDER — AZATHIOPRINE 75 MG/1
AS DIRECTED TABLET ORAL QD
Qty: 90 | Refills: 3
End: 2022-07-10

## 2021-07-22 ENCOUNTER — TELEPHONE ENCOUNTER (OUTPATIENT)
Dept: URBAN - METROPOLITAN AREA CLINIC 77 | Facility: CLINIC | Age: 71
End: 2021-07-22

## 2021-07-22 RX ORDER — AZATHIOPRINE 50 MG/1
1 AND HALF TABS TABLET ORAL DAILY
Qty: 135 | Refills: 3 | OUTPATIENT

## 2021-08-09 ENCOUNTER — WEB ENCOUNTER (OUTPATIENT)
Dept: URBAN - METROPOLITAN AREA CLINIC 23 | Facility: CLINIC | Age: 71
End: 2021-08-09

## 2021-08-22 ENCOUNTER — WEB ENCOUNTER (OUTPATIENT)
Dept: URBAN - METROPOLITAN AREA CLINIC 23 | Facility: CLINIC | Age: 71
End: 2021-08-22

## 2021-08-25 ENCOUNTER — WEB ENCOUNTER (OUTPATIENT)
Dept: URBAN - METROPOLITAN AREA CLINIC 23 | Facility: CLINIC | Age: 71
End: 2021-08-25

## 2021-09-16 ENCOUNTER — WEB ENCOUNTER (OUTPATIENT)
Dept: URBAN - METROPOLITAN AREA CLINIC 23 | Facility: CLINIC | Age: 71
End: 2021-09-16

## 2021-09-20 ENCOUNTER — LAB OUTSIDE AN ENCOUNTER (OUTPATIENT)
Dept: URBAN - METROPOLITAN AREA CLINIC 23 | Facility: CLINIC | Age: 71
End: 2021-09-20

## 2021-09-22 ENCOUNTER — TELEPHONE ENCOUNTER (OUTPATIENT)
Dept: URBAN - METROPOLITAN AREA CLINIC 77 | Facility: CLINIC | Age: 71
End: 2021-09-22

## 2021-09-25 LAB
A/G RATIO: 1.8
ALBUMIN: 4.4
ALKALINE PHOSPHATASE: 80
ALT (SGPT): 24
AST (SGOT): 36
BILIRUBIN, TOTAL: 0.3
BUN/CREATININE RATIO: 20
BUN: 15
CALCIUM: 9.7
CARBON DIOXIDE, TOTAL: 29
CHLORIDE: 105
CREATININE: 0.75
EGFR IF AFRICN AM: 91
EGFR IF NONAFRICN AM: 79
GLOBULIN, TOTAL: 2.4
GLUCOSE: 93
HEMATOCRIT: 40.5
HEMOGLOBIN: 13.5
IGG, IMMUNOGLOBULIN G (RDL): 933
MCH: 33.5
MCHC: 33.3
MCV: 101
NRBC: (no result)
PLATELETS: 220
POTASSIUM: 4.9
PROTEIN, TOTAL: 6.8
RBC: 4.03
RDW: 12.8
SODIUM: 144
WBC: 5.1

## 2021-10-05 ENCOUNTER — OFFICE VISIT (OUTPATIENT)
Dept: URBAN - METROPOLITAN AREA CLINIC 23 | Facility: CLINIC | Age: 71
End: 2021-10-05

## 2021-10-12 ENCOUNTER — OFFICE VISIT (OUTPATIENT)
Dept: URBAN - METROPOLITAN AREA CLINIC 23 | Facility: CLINIC | Age: 71
End: 2021-10-12
Payer: MEDICARE

## 2021-10-12 DIAGNOSIS — K28.9 MARGINAL ULCER: ICD-10-CM

## 2021-10-12 DIAGNOSIS — K75.4 AUTOIMMUNE HEPATITIS: ICD-10-CM

## 2021-10-12 DIAGNOSIS — K63.89 SMALL INTESTINAL BACTERIAL OVERGROWTH (SIBO): ICD-10-CM

## 2021-10-12 DIAGNOSIS — K21.00 GASTROESOPHAGEAL REFLUX DISEASE WITH ESOPHAGITIS, UNSPECIFIED WHETHER HEMORRHAGE: ICD-10-CM

## 2021-10-12 DIAGNOSIS — Z98.84 HISTORY OF ROUX-EN-Y GASTRIC BYPASS: ICD-10-CM

## 2021-10-12 DIAGNOSIS — K75.81 NASH (NONALCOHOLIC STEATOHEPATITIS): ICD-10-CM

## 2021-10-12 DIAGNOSIS — K86.81 EXOCRINE PANCREATIC INSUFFICIENCY: ICD-10-CM

## 2021-10-12 PROCEDURE — G9622 NO UNHEAL ETOH USER: HCPCS | Performed by: INTERNAL MEDICINE

## 2021-10-12 PROCEDURE — 1036F TOBACCO NON-USER: CPT | Performed by: INTERNAL MEDICINE

## 2021-10-12 PROCEDURE — 99214 OFFICE O/P EST MOD 30 MIN: CPT | Performed by: INTERNAL MEDICINE

## 2021-10-12 PROCEDURE — G8420 CALC BMI NORM PARAMETERS: HCPCS | Performed by: INTERNAL MEDICINE

## 2021-10-12 PROCEDURE — G8427 DOCREV CUR MEDS BY ELIG CLIN: HCPCS | Performed by: INTERNAL MEDICINE

## 2021-10-12 PROCEDURE — 3017F COLORECTAL CA SCREEN DOC REV: CPT | Performed by: INTERNAL MEDICINE

## 2021-10-12 RX ORDER — THIAMINE HCL 100 MG
1 TABLET TABLET ORAL ONCE A DAY
Status: ACTIVE | COMMUNITY

## 2021-10-12 RX ORDER — DENOSUMAB 60 MG/ML
AS DIRECTED INJECTION SUBCUTANEOUS
Status: ACTIVE | COMMUNITY

## 2021-10-12 RX ORDER — PANCRELIPASE 36000; 180000; 114000 [USP'U]/1; [USP'U]/1; [USP'U]/1
AS DIRECTED CAPSULE, DELAYED RELEASE PELLETS ORAL
Qty: 750 | Refills: 3

## 2021-10-12 RX ORDER — LIFITEGRAST 50 MG/ML
1 DROP INTO AFFECTED EYE SOLUTION/ DROPS OPHTHALMIC TWICE A DAY
Status: ACTIVE | COMMUNITY

## 2021-10-12 RX ORDER — GABAPENTIN 300 MG/1
1 CAPSULE CAPSULE ORAL ONCE A DAY
Status: ACTIVE | COMMUNITY

## 2021-10-12 RX ORDER — LEVETIRACETAM 500 MG
1 TABLET TABLET ORAL TWICE A DAY
Status: ACTIVE | COMMUNITY

## 2021-10-12 RX ORDER — AZATHIOPRINE 75 MG/1
AS DIRECTED TABLET ORAL QD
Qty: 90 | Refills: 3
End: 2022-10-08

## 2021-10-12 RX ORDER — PANCRELIPASE 36000; 180000; 114000 [USP'U]/1; [USP'U]/1; [USP'U]/1
AS DIRECTED CAPSULE, DELAYED RELEASE PELLETS ORAL
Qty: 1000 CAPSULE | Refills: 0 | Status: ACTIVE | COMMUNITY

## 2021-10-12 RX ORDER — AZATHIOPRINE 75 MG/1
AS DIRECTED TABLET ORAL QD
Qty: 90 | Refills: 3 | Status: ACTIVE | COMMUNITY
End: 2022-07-10

## 2021-10-12 RX ORDER — BRIMONIDINE TARTRATE, TIMOLOL MALEATE 2; 5 MG/ML; MG/ML
1 DROP INTO AFFECTED EYE SOLUTION/ DROPS OPHTHALMIC TWICE A DAY
Status: ACTIVE | COMMUNITY

## 2021-10-12 NOTE — HPI-TODAY'S VISIT:
71-year-old female presents for follow-up of autoimmune hepatitis, SIBO, EPI.  Labs for autoimmune hepatitis are all normal.  She is on 75 mg Imuran.  She takes Creon 36,000 units, 2 caps with meals and 1 With snack.  She is on Flagyl as needed for SIBO.  Currently she is doing well overall.  She is going to get evaluation for left-sided shoulder and knee pain. She is following Dr. Victoria endocrinologist for hyperparathyroidism and osteoporosis.  She started taking pantoprazole back again 3 weeks ago for hoarseness, seen by ENT.  Question is resolved after pantoprazole.

## 2021-10-12 NOTE — PREVIOUS WORKUP REVIEWED
REVIEWED EXTERNAL MEDICAL RECORDENDOSCOPIES:-Push enteroscopy 10/20/2020: down to JJ anastomosis. Healthy GJ and JJ anastomosis. Normal exam of the esophagus and the gastric pouch. -Colonoscopy 10/20/2020: hemorrhoids. Normal TI. A 3 mm polyp in the ascending colon. Diverticulosis in the sigmoid colon. Random colon biopsy taken to rule out microscopic colitis. A scar at the ileocecal valve.*Pathology: random gastric-minimal chronic inflammation, negative H. pylori. Ascending colon polyp-tubular adenoma. Random colon biopsy-normal.LABS: -Labs 9/22/2021: WBC 5.1, hemoglobin 13.5, platelet 220, total bilirubin 0.3, alkaline phosphatase 80, AST 36, ALT 24.  Total protein 6.8, albumin 4.4, IgG 933.-Labs 4/26/2021: WBC 4.6, hemoglobin 13.1, platelet 232, BUN 15, creatinine 0.75, sodium 145, potassium 4.7, total protein 6.2, albumin 3.8, total bilirubin 0.4, alkaline phosphatase 99, AST 39, ALT 25, IgG 959.-Labs 3/16/2021: WBC 5.4, hemoglobin 14.6, platelet 234, BUN 13, creatinine 0.78, sodium 146, potassium 4.3, total protein 6.2, albumin 3.8, total bilirubin 0.4, alkaline phosphatase 77, AST 42, ALT 30.-Labs 02/01/2021: BUN 18, creatinine 0.82, total bilirubin 0.5, alkaline phosphatase 102, AST 48 H, ALT 44 H, WBC 9.0, hemoglobin 15.1, platelet 145. IgG 874.-Labs 01/12/2021: total bilirubin 0.5, alkaline phosphatase 122, AST 48, ALT 48, WBC 8.1, hemoglobin 14, platelets 135.-Labs 12/8/2020: BUN 10, creatinine 0.75, total protein 6, albumin 3.4, total bilirubin 0.5, alkaline phosphatase 131, AST 59, ALT 47-Labs 11/25/2020: WBC 8.0, hemoglobin 14.1, platelets 155. Total bilirubin 0.6, alkaline phosphatase 112, AST 52, ALT 40.-Labs 11/12/2020: IgG 1016. WBC 9.2, hemoglobin 14.7, platelet 206. Total bilirubin 0.6, alkaline phosphatase 124, AST 40, ALT 38.-Labs 10/28/2020: WBC 10.7, hemoglobin 14.1, platelet 210, total bilirubin 0.5, alkaline phosphatase 118, AST 38, ALT 40.-Labs 10/14/2020: total bilirubin 0.8, alkaline phosphatase 139, AST 47, ALT 52.-Labs 9/30/2020: total bilirubin 0.8, alkaline phosphatase 149, AST 48, ALT 56. WBC 10.0, hemoglobin 13.9,, platelet 130, IgG 1241.-Labs 9/23/2020: TPMT enzyme activity normal. total bilirubin 0.9, alkaline phosphatase 166, AST 51, ALT 63. IgG 1590.-Labs 9/16/2020: total bilirubin 1.1, direct bilirubin 0.60, alkaline phosphatase 218, AST 65, ALT 74. IgG 1990.-Labs 9/8/2020: IgG 2604. WBC 9.9, hemoglobin 14.2, platelet 364.-Lab 8/28/2020: WBC 11.7, hemoglobin 13.3, platelet 208, total bilirubin 1.7, alkaline phosphatase 168, AST 95, . H pyloti ab neg. -Labs 8/17/2020: IgG 2102 H, ceruloplasmin 24.7, hep B surface antibody positive, anti-smooth muscle antibody 54 H, anti-mitochondrial antibody negative, hep A IgM negative, alpha-1 antitrypsin 135 MZ, ANCA panel negative, EDUARDO negative, hep B core antibody negative, hep B surface antigen negative, hep C antibody negative,-Labs 7/29/2020: Total bilirubin 3.6, alkaline phosphatase 254, , , total protein 6.8, albumin 3.0, creatinine 0.91, BUN 17, hereditary hemochromatosis negative.-Labs 7/28/2020: pancreatic elastase, fecal 315 normal.-Labs 7/27/2020: WBC 5.6, hemoglobin 14.1, platelet 242, fecal pancreatic elastase 315 normal, ferritin 4121, vitamin B12 higher >2000, iron saturation 91%.-Labs 1/8/2019: total protein 6.2, albumin 3.5, total bilirubin 0.6, alkaline phosphatase 122, AST 38 H, ALT 61 H.-Labs 1/9/2019 total bilirubin 0.6, alkaline phosphatase 110, AST 25, ALT 36.IMAGES:-CT abdomen and pelvis with contrast 8/26/2020: status post gastric bypass. Small hiatal hernia. Pneumoperitoneum consistent with the bowel perforation. Sigmoid diverticulosis. Abdominal ascites. Normal pancreas. Normal spleen with normal size. Normal liver.-Abdominal ultrasound 8/21/2020: No liver mass. Normal biliary duct. Patent portal vein. Status post cholecystectomy. Normal spleen, 12 cm. Normal pancreas. No ascites.LIVER BIOPSY:-Pathology liver wedge biopsy 8/31/2020: worrisome for cirrhosis. Nodular architecture and chronic portal inflammatory infiltrate predominantly lymphocytes with plasma cells. No significant lobular or interface activity. No significant the steatosis were ballooning degeneration. No granulomas. Bridging fibrosis. Mild increase in iron stores.&

## 2021-10-29 ENCOUNTER — WEB ENCOUNTER (OUTPATIENT)
Dept: URBAN - METROPOLITAN AREA CLINIC 23 | Facility: CLINIC | Age: 71
End: 2021-10-29

## 2021-11-15 ENCOUNTER — WEB ENCOUNTER (OUTPATIENT)
Dept: URBAN - METROPOLITAN AREA CLINIC 23 | Facility: CLINIC | Age: 71
End: 2021-11-15

## 2021-11-15 RX ORDER — BRIMONIDINE TARTRATE, TIMOLOL MALEATE 2; 5 MG/ML; MG/ML
1 DROP INTO AFFECTED EYE SOLUTION/ DROPS OPHTHALMIC TWICE A DAY
Status: ACTIVE | COMMUNITY

## 2021-11-15 RX ORDER — LIFITEGRAST 50 MG/ML
1 DROP INTO AFFECTED EYE SOLUTION/ DROPS OPHTHALMIC TWICE A DAY
Status: ACTIVE | COMMUNITY

## 2021-11-15 RX ORDER — GABAPENTIN 300 MG/1
1 CAPSULE CAPSULE ORAL ONCE A DAY
Status: ACTIVE | COMMUNITY

## 2021-11-15 RX ORDER — LEVETIRACETAM 500 MG
1 TABLET TABLET ORAL TWICE A DAY
Status: ACTIVE | COMMUNITY

## 2021-11-15 RX ORDER — PANCRELIPASE 36000; 180000; 114000 [USP'U]/1; [USP'U]/1; [USP'U]/1
AS DIRECTED CAPSULE, DELAYED RELEASE PELLETS ORAL
Qty: 750 | Refills: 3 | Status: ACTIVE | COMMUNITY

## 2021-11-15 RX ORDER — AZATHIOPRINE 75 MG/1
AS DIRECTED TABLET ORAL QD
Qty: 90 | Refills: 3 | Status: ACTIVE | COMMUNITY
End: 2022-10-08

## 2021-11-15 RX ORDER — DENOSUMAB 60 MG/ML
AS DIRECTED INJECTION SUBCUTANEOUS
Status: ACTIVE | COMMUNITY

## 2021-11-15 RX ORDER — METRONIDAZOLE 500 MG/1
1 TABLET TABLET ORAL THREE TIMES A DAY
Qty: 42 TABLET | OUTPATIENT
Start: 2021-11-15 | End: 2021-11-29

## 2021-11-15 RX ORDER — THIAMINE HCL 100 MG
1 TABLET TABLET ORAL ONCE A DAY
Status: ACTIVE | COMMUNITY

## 2021-11-18 ENCOUNTER — WEB ENCOUNTER (OUTPATIENT)
Dept: URBAN - METROPOLITAN AREA CLINIC 23 | Facility: CLINIC | Age: 71
End: 2021-11-18

## 2021-11-18 RX ORDER — PANTOPRAZOLE SODIUM 40 MG/1
1 TABLET TABLET, DELAYED RELEASE ORAL ONCE A DAY
Qty: 90 TABLET | Refills: 3

## 2022-01-27 ENCOUNTER — WEB ENCOUNTER (OUTPATIENT)
Dept: URBAN - METROPOLITAN AREA CLINIC 23 | Facility: CLINIC | Age: 72
End: 2022-01-27

## 2022-04-08 ENCOUNTER — WEB ENCOUNTER (OUTPATIENT)
Dept: URBAN - METROPOLITAN AREA CLINIC 23 | Facility: CLINIC | Age: 72
End: 2022-04-08

## 2022-04-12 ENCOUNTER — WEB ENCOUNTER (OUTPATIENT)
Dept: URBAN - METROPOLITAN AREA CLINIC 23 | Facility: CLINIC | Age: 72
End: 2022-04-12

## 2022-04-16 LAB
A/G RATIO: 1.9
ALBUMIN: 4.5
ALKALINE PHOSPHATASE: 71
ALT (SGPT): 19
AST (SGOT): 33
BILIRUBIN, TOTAL: 0.5
BUN/CREATININE RATIO: 18
BUN: 14
CALCIUM: 9.5
CARBON DIOXIDE, TOTAL: 23
CHLORIDE: 103
CREATININE: 0.79
EGFR: 78
GLOBULIN, TOTAL: 2.4
GLUCOSE: 91
HEMATOCRIT: 48.2
HEMATOLOGY COMMENTS:: (no result)
HEMOGLOBIN: 15.9
IGG, IMMUNOGLOBULIN G (RDL): 863
MCH: 32.1
MCHC: 33
MCV: 97
NRBC: (no result)
PLATELETS: (no result)
POTASSIUM: 4.6
PROTEIN, TOTAL: 6.9
RBC: 4.95
RDW: 12.8
SODIUM: 142
WBC: 4.9

## 2022-04-18 ENCOUNTER — OFFICE VISIT (OUTPATIENT)
Dept: URBAN - METROPOLITAN AREA CLINIC 23 | Facility: CLINIC | Age: 72
End: 2022-04-18

## 2022-04-18 ENCOUNTER — OFFICE VISIT (OUTPATIENT)
Dept: URBAN - METROPOLITAN AREA CLINIC 23 | Facility: CLINIC | Age: 72
End: 2022-04-18
Payer: MEDICARE

## 2022-04-18 DIAGNOSIS — K75.4 AUTOIMMUNE HEPATITIS: ICD-10-CM

## 2022-04-18 DIAGNOSIS — K63.89 SMALL INTESTINAL BACTERIAL OVERGROWTH (SIBO): ICD-10-CM

## 2022-04-18 DIAGNOSIS — K75.81 NASH (NONALCOHOLIC STEATOHEPATITIS): ICD-10-CM

## 2022-04-18 DIAGNOSIS — K28.9 MARGINAL ULCER: ICD-10-CM

## 2022-04-18 DIAGNOSIS — Z98.84 HISTORY OF ROUX-EN-Y GASTRIC BYPASS: ICD-10-CM

## 2022-04-18 DIAGNOSIS — K86.81 EXOCRINE PANCREATIC INSUFFICIENCY: ICD-10-CM

## 2022-04-18 DIAGNOSIS — K21.00 GASTROESOPHAGEAL REFLUX DISEASE WITH ESOPHAGITIS, UNSPECIFIED WHETHER HEMORRHAGE: ICD-10-CM

## 2022-04-18 PROBLEM — 442685003: Status: ACTIVE | Noted: 2020-10-04

## 2022-04-18 PROBLEM — 275566005: Status: ACTIVE | Noted: 2020-08-27

## 2022-04-18 PROBLEM — 447408004: Status: ACTIVE | Noted: 2020-10-04

## 2022-04-18 PROCEDURE — 99214 OFFICE O/P EST MOD 30 MIN: CPT | Performed by: INTERNAL MEDICINE

## 2022-04-18 PROCEDURE — G9622 NO UNHEAL ETOH USER: HCPCS | Performed by: INTERNAL MEDICINE

## 2022-04-18 PROCEDURE — 3017F COLORECTAL CA SCREEN DOC REV: CPT | Performed by: INTERNAL MEDICINE

## 2022-04-18 PROCEDURE — G8427 DOCREV CUR MEDS BY ELIG CLIN: HCPCS | Performed by: INTERNAL MEDICINE

## 2022-04-18 PROCEDURE — 1036F TOBACCO NON-USER: CPT | Performed by: INTERNAL MEDICINE

## 2022-04-18 PROCEDURE — G8420 CALC BMI NORM PARAMETERS: HCPCS | Performed by: INTERNAL MEDICINE

## 2022-04-18 RX ORDER — GABAPENTIN 300 MG/1
1 CAPSULE CAPSULE ORAL ONCE A DAY
Status: ACTIVE | COMMUNITY

## 2022-04-18 RX ORDER — PANCRELIPASE 36000; 180000; 114000 [USP'U]/1; [USP'U]/1; [USP'U]/1
AS DIRECTED CAPSULE, DELAYED RELEASE PELLETS ORAL
Qty: 750 | Refills: 3 | Status: ACTIVE | COMMUNITY

## 2022-04-18 RX ORDER — DULOXETINE HYDROCHLORIDE 30 MG/1
1 CAPSULE CAPSULE, DELAYED RELEASE ORAL ONCE A DAY
Status: ACTIVE | COMMUNITY

## 2022-04-18 RX ORDER — LEVETIRACETAM 500 MG
1 TABLET TABLET ORAL TWICE A DAY
Status: ACTIVE | COMMUNITY

## 2022-04-18 RX ORDER — DENOSUMAB 60 MG/ML
AS DIRECTED INJECTION SUBCUTANEOUS
Status: ACTIVE | COMMUNITY

## 2022-04-18 RX ORDER — LIFITEGRAST 50 MG/ML
1 DROP INTO AFFECTED EYE SOLUTION/ DROPS OPHTHALMIC TWICE A DAY
Status: ACTIVE | COMMUNITY

## 2022-04-18 RX ORDER — PANTOPRAZOLE SODIUM 40 MG/1
1 TABLET TABLET, DELAYED RELEASE ORAL ONCE A DAY
Qty: 90 TABLET | Refills: 3 | Status: ACTIVE | COMMUNITY

## 2022-04-18 RX ORDER — BRIMONIDINE TARTRATE, TIMOLOL MALEATE 2; 5 MG/ML; MG/ML
1 DROP INTO AFFECTED EYE SOLUTION/ DROPS OPHTHALMIC TWICE A DAY
Status: ACTIVE | COMMUNITY

## 2022-04-18 RX ORDER — THIAMINE HCL 100 MG
1 TABLET TABLET ORAL ONCE A DAY
Status: ACTIVE | COMMUNITY

## 2022-04-18 RX ORDER — AZATHIOPRINE 75 MG/1
AS DIRECTED TABLET ORAL QD
Qty: 90 | Refills: 3 | Status: ACTIVE | COMMUNITY
End: 2022-10-08

## 2022-04-18 NOTE — HPI-TODAY'S VISIT:
72-year-old female with autoimmune hepatitis presents for follow-up 6 months.  She is doing well.  She takes 75 mg Imuran daily.  No issues with the medication. No flareup of SIBO for a while.  She still takes Creon dose adjusting based on her intake.  She is on pantoprazole for LPR.  Her symptoms as hoarseness. She is following rheumatologist for chronic pain.  Sounds like she was diagnosed as neuropathy and fibromyalgia.  She is on Cymbalta which helps greatly.  She used to take Tylenol frequently but not anymore.

## 2022-04-18 NOTE — PREVIOUS WORKUP REVIEWED
.REVIEWED EXTERNAL MEDICAL RECORDENDOSCOPIES:-Push enteroscopy 10/20/2020: down to JJ anastomosis. Healthy GJ and JJ anastomosis. Normal exam of the esophagus and the gastric pouch. -Colonoscopy 10/20/2020: hemorrhoids. Normal TI. A 3 mm polyp in the ascending colon. Diverticulosis in the sigmoid colon. Random colon biopsy taken to rule out microscopic colitis. A scar at the ileocecal valve.*Pathology: random gastric-minimal chronic inflammation, negative H. pylori. Ascending colon polyp-tubular adenoma. Random colon biopsy-normal.LABS: -Labs 4/12/2022:BUN 14, creatinine 0.79, total protein 6.9, albumin 4.5, total bilirubin 0.5, alkaline phosphatase 71, AST 33, ALT 19, WBC 4.9, hemoglobin 15.9, platelet TNP.  IgG 863.-Labs 9/22/2021: WBC 5.1, hemoglobin 13.5, platelet 220, total bilirubin 0.3, alkaline phosphatase 80, AST 36, ALT 24.  Total protein 6.8, albumin 4.4, IgG 933.-Labs 4/26/2021: WBC 4.6, hemoglobin 13.1, platelet 232, BUN 15, creatinine 0.75, sodium 145, potassium 4.7, total protein 6.2, albumin 3.8, total bilirubin 0.4, alkaline phosphatase 99, AST 39, ALT 25, IgG 959.-Labs 3/16/2021: WBC 5.4, hemoglobin 14.6, platelet 234, BUN 13, creatinine 0.78, sodium 146, potassium 4.3, total protein 6.2, albumin 3.8, total bilirubin 0.4, alkaline phosphatase 77, AST 42, ALT 30.-Labs 02/01/2021: BUN 18, creatinine 0.82, total bilirubin 0.5, alkaline phosphatase 102, AST 48 H, ALT 44 H, WBC 9.0, hemoglobin 15.1, platelet 145. IgG 874.-Labs 01/12/2021: total bilirubin 0.5, alkaline phosphatase 122, AST 48, ALT 48, WBC 8.1, hemoglobin 14, platelets 135.-Labs 12/8/2020: BUN 10, creatinine 0.75, total protein 6, albumin 3.4, total bilirubin 0.5, alkaline phosphatase 131, AST 59, ALT 47-Labs 11/25/2020: WBC 8.0, hemoglobin 14.1, platelets 155. Total bilirubin 0.6, alkaline phosphatase 112, AST 52, ALT 40.-Labs 11/12/2020: IgG 1016. WBC 9.2, hemoglobin 14.7, platelet 206. Total bilirubin 0.6, alkaline phosphatase 124, AST 40, ALT 38.-Labs 10/28/2020: WBC 10.7, hemoglobin 14.1, platelet 210, total bilirubin 0.5, alkaline phosphatase 118, AST 38, ALT 40.-Labs 10/14/2020: total bilirubin 0.8, alkaline phosphatase 139, AST 47, ALT 52.-Labs 9/30/2020: total bilirubin 0.8, alkaline phosphatase 149, AST 48, ALT 56. WBC 10.0, hemoglobin 13.9,, platelet 130, IgG 1241.-Labs 9/23/2020: TPMT enzyme activity normal. total bilirubin 0.9, alkaline phosphatase 166, AST 51, ALT 63. IgG 1590.-Labs 9/16/2020: total bilirubin 1.1, direct bilirubin 0.60, alkaline phosphatase 218, AST 65, ALT 74. IgG 1990.-Labs 9/8/2020: IgG 2604. WBC 9.9, hemoglobin 14.2, platelet 364.-Lab 8/28/2020: WBC 11.7, hemoglobin 13.3, platelet 208, total bilirubin 1.7, alkaline phosphatase 168, AST 95, . H pyloti ab neg. -Labs 8/17/2020: IgG 2102 H, ceruloplasmin 24.7, hep B surface antibody positive, anti-smooth muscle antibody 54 H, anti-mitochondrial antibody negative, hep A IgM negative, alpha-1 antitrypsin 135 MZ, ANCA panel negative, EDUARDO negative, hep B core antibody negative, hep B surface antigen negative, hep C antibody negative,-Labs 7/29/2020: Total bilirubin 3.6, alkaline phosphatase 254, , , total protein 6.8, albumin 3.0, creatinine 0.91, BUN 17, hereditary hemochromatosis negative.-Labs 7/28/2020: pancreatic elastase, fecal 315 normal.-Labs 7/27/2020: WBC 5.6, hemoglobin 14.1, platelet 242, fecal pancreatic elastase 315 normal, ferritin 4121, vitamin B12 higher >2000, iron saturation 91%.-Labs 1/8/2019: total protein 6.2, albumin 3.5, total bilirubin 0.6, alkaline phosphatase 122, AST 38 H, ALT 61 H.-Labs 1/9/2019 total bilirubin 0.6, alkaline phosphatase 110, AST 25, ALT 36.IMAGES:-CT abdomen and pelvis with contrast 8/26/2020: status post gastric bypass. Small hiatal hernia. Pneumoperitoneum consistent with the bowel perforation. Sigmoid diverticulosis. Abdominal ascites. Normal pancreas. Normal spleen with normal size. Normal liver.-Abdominal ultrasound 8/21/2020: No liver mass. Normal biliary duct. Patent portal vein. Status post cholecystectomy. Normal spleen, 12 cm. Normal pancreas. No ascites.LIVER BIOPSY:-Pathology liver wedge biopsy 8/31/2020: worrisome for cirrhosis. Nodular architecture and chronic portal inflammatory infiltrate predominantly lymphocytes with plasma cells. No significant lobular or interface activity. No significant steatosis or ballooning degeneration. No granulomas. Bridging fibrosis. Mild increase in iron stores.

## 2022-05-02 ENCOUNTER — WEB ENCOUNTER (OUTPATIENT)
Dept: URBAN - METROPOLITAN AREA CLINIC 23 | Facility: CLINIC | Age: 72
End: 2022-05-02

## 2022-05-02 RX ORDER — PANTOPRAZOLE SODIUM 40 MG/1
1 TABLET TABLET, DELAYED RELEASE ORAL ONCE A DAY
Qty: 90 TABLET | Refills: 3

## 2022-08-17 ENCOUNTER — WEB ENCOUNTER (OUTPATIENT)
Dept: URBAN - METROPOLITAN AREA CLINIC 23 | Facility: CLINIC | Age: 72
End: 2022-08-17

## 2022-08-19 ENCOUNTER — LAB OUTSIDE AN ENCOUNTER (OUTPATIENT)
Dept: URBAN - METROPOLITAN AREA CLINIC 23 | Facility: CLINIC | Age: 72
End: 2022-08-19

## 2022-10-03 ENCOUNTER — LAB OUTSIDE AN ENCOUNTER (OUTPATIENT)
Dept: URBAN - METROPOLITAN AREA CLINIC 78 | Facility: CLINIC | Age: 72
End: 2022-10-03

## 2022-10-05 LAB
A/G RATIO: 1.6
ALBUMIN: 3.9
ALKALINE PHOSPHATASE: 68
ALT (SGPT): 18
AST (SGOT): 26
BILIRUBIN, TOTAL: 0.4
BUN/CREATININE RATIO: (no result)
BUN: 14
CALCIUM: 8.7
CARBON DIOXIDE, TOTAL: 25
CHLORIDE: 107
CHOL/HDLC RATIO: 2.4
CHOLESTEROL, TOTAL: 182
CREATININE: 0.7
EGFR: 91
GLOBULIN, TOTAL: 2.5
GLUCOSE: 91
HDL CHOLESTEROL: 77
HEMATOCRIT: 40.9
HEMOGLOBIN A1C: 5.2
HEMOGLOBIN: 13.8
IMMUNOGLOBULIN G, QN, SERUM: 781
IRON BIND.CAP.(TIBC): 310
IRON SATURATION: 26
IRON: 81
LDL CHOLESTEROL CALC: 85
MCH: 31.8
MCHC: 33.7
MCV: 94.2
MPV: 9.6
NON HDL CHOLESTEROL: 105
PARATHYROID HORMONE,: 162
PLATELET COUNT: 289
POTASSIUM: 4.7
PROTEIN, TOTAL: 6.4
RDW: 13
RED BLOOD CELL COUNT: 4.34
SODIUM: 142
TRIGLYCERIDES: 103
VITAMIN B12: 558
VITAMIN D,25-OH,TOTAL,IA: 64
WHITE BLOOD CELL COUNT: 5.1

## 2022-10-17 ENCOUNTER — OFFICE VISIT (OUTPATIENT)
Dept: URBAN - METROPOLITAN AREA CLINIC 23 | Facility: CLINIC | Age: 72
End: 2022-10-17
Payer: MEDICARE

## 2022-10-17 VITALS — HEIGHT: 65 IN | WEIGHT: 147 LBS | TEMPERATURE: 97.5 F | BODY MASS INDEX: 24.49 KG/M2

## 2022-10-17 DIAGNOSIS — K21.00 GASTROESOPHAGEAL REFLUX DISEASE WITH ESOPHAGITIS, UNSPECIFIED WHETHER HEMORRHAGE: ICD-10-CM

## 2022-10-17 DIAGNOSIS — K75.4 AUTOIMMUNE HEPATITIS: ICD-10-CM

## 2022-10-17 DIAGNOSIS — K63.89 SMALL INTESTINAL BACTERIAL OVERGROWTH (SIBO): ICD-10-CM

## 2022-10-17 DIAGNOSIS — K86.81 EXOCRINE PANCREATIC INSUFFICIENCY: ICD-10-CM

## 2022-10-17 PROBLEM — 408335007: Status: ACTIVE | Noted: 2020-08-21

## 2022-10-17 PROBLEM — 47367009: Status: ACTIVE | Noted: 2020-08-27

## 2022-10-17 PROBLEM — 266433003: Status: ACTIVE | Noted: 2021-10-13

## 2022-10-17 PROCEDURE — 1036F TOBACCO NON-USER: CPT | Performed by: INTERNAL MEDICINE

## 2022-10-17 PROCEDURE — G9903 PT SCRN TBCO ID AS NON USER: HCPCS | Performed by: INTERNAL MEDICINE

## 2022-10-17 PROCEDURE — G8420 CALC BMI NORM PARAMETERS: HCPCS | Performed by: INTERNAL MEDICINE

## 2022-10-17 PROCEDURE — G9622 NO UNHEAL ETOH USER: HCPCS | Performed by: INTERNAL MEDICINE

## 2022-10-17 PROCEDURE — 99214 OFFICE O/P EST MOD 30 MIN: CPT | Performed by: INTERNAL MEDICINE

## 2022-10-17 PROCEDURE — G8427 DOCREV CUR MEDS BY ELIG CLIN: HCPCS | Performed by: INTERNAL MEDICINE

## 2022-10-17 PROCEDURE — 3017F COLORECTAL CA SCREEN DOC REV: CPT | Performed by: INTERNAL MEDICINE

## 2022-10-17 RX ORDER — PANTOPRAZOLE SODIUM 40 MG/1
1 TABLET TABLET, DELAYED RELEASE ORAL ONCE A DAY
Qty: 90 TABLET | Refills: 3 | Status: ACTIVE | COMMUNITY

## 2022-10-17 RX ORDER — PANTOPRAZOLE SODIUM 20 MG/1
1 TABLET TABLET, DELAYED RELEASE ORAL ONCE A DAY
Qty: 90 TABLET | Refills: 3 | OUTPATIENT
Start: 2022-10-17

## 2022-10-17 RX ORDER — GABAPENTIN 300 MG/1
1 CAPSULE CAPSULE ORAL ONCE A DAY
Status: ACTIVE | COMMUNITY

## 2022-10-17 RX ORDER — LIFITEGRAST 50 MG/ML
1 DROP INTO AFFECTED EYE SOLUTION/ DROPS OPHTHALMIC TWICE A DAY
Status: ACTIVE | COMMUNITY

## 2022-10-17 RX ORDER — PANCRELIPASE 36000; 180000; 114000 [USP'U]/1; [USP'U]/1; [USP'U]/1
AS DIRECTED CAPSULE, DELAYED RELEASE PELLETS ORAL
Qty: 600 | Refills: 3 | OUTPATIENT
Start: 2022-10-17 | End: 2023-10-12

## 2022-10-17 RX ORDER — BRIMONIDINE TARTRATE, TIMOLOL MALEATE 2; 5 MG/ML; MG/ML
1 DROP INTO AFFECTED EYE SOLUTION/ DROPS OPHTHALMIC TWICE A DAY
Status: ACTIVE | COMMUNITY

## 2022-10-17 RX ORDER — DENOSUMAB 60 MG/ML
AS DIRECTED INJECTION SUBCUTANEOUS
Status: ACTIVE | COMMUNITY

## 2022-10-17 RX ORDER — AZATHIOPRINE 75 MG/1
AS DIRECTED TABLET ORAL
Status: ACTIVE | COMMUNITY

## 2022-10-17 RX ORDER — DULOXETINE HYDROCHLORIDE 30 MG/1
1 CAPSULE CAPSULE, DELAYED RELEASE ORAL ONCE A DAY
Status: ACTIVE | COMMUNITY

## 2022-10-17 RX ORDER — METRONIDAZOLE 500 MG/1
1 TABLET TABLET ORAL THREE TIMES A DAY
Qty: 30 | OUTPATIENT
Start: 2022-10-17 | End: 2022-10-27

## 2022-10-17 RX ORDER — PANCRELIPASE 36000; 180000; 114000 [USP'U]/1; [USP'U]/1; [USP'U]/1
AS DIRECTED CAPSULE, DELAYED RELEASE PELLETS ORAL
Qty: 750 | Refills: 3 | Status: ACTIVE | COMMUNITY

## 2022-10-17 NOTE — PREVIOUS WORKUP REVIEWED
.REVIEWED EXTERNAL MEDICAL RECORDENDOSCOPIES:-Push enteroscopy 10/20/2020: down to JJ anastomosis. Healthy GJ and JJ anastomosis. Normal exam of the esophagus and the gastric pouch. -Colonoscopy 10/20/2020: hemorrhoids. Normal TI. A 3 mm polyp in the ascending colon. Diverticulosis in the sigmoid colon. Random colon biopsy taken to rule out microscopic colitis. A scar at the ileocecal valve.*Pathology: random gastric-minimal chronic inflammation, negative H. pylori. Ascending colon polyp-tubular adenoma. Random colon biopsy-normal.LABS: -Labs 10/3/2022: WBC 5.1, hemoglobin 13.8, platelet 289, BUN 14, creatinine 0.7, sodium 142, potassium 4.7, total protein 6.4, abdomen 3.9, total bilirubin 0.4, alkaline phosphatase 68, AST 26, ALT 18, IgG 863, iron saturation 26%, vitamin B12 558, vitamin D 64.  Total cholesterol 182, LDL 85, hemoglobin A1c 5.2.   H.-Labs 4/12/2022:BUN 14, creatinine 0.79, total protein 6.9, albumin 4.5, total bilirubin 0.5, alkaline phosphatase 71, AST 33, ALT 19, WBC 4.9, hemoglobin 15.9, platelet TNP.  IgG 863.-Labs 9/22/2021: WBC 5.1, hemoglobin 13.5, platelet 220, total bilirubin 0.3, alkaline phosphatase 80, AST 36, ALT 24.  Total protein 6.8, albumin 4.4, IgG 933.-Labs 4/26/2021: WBC 4.6, hemoglobin 13.1, platelet 232, BUN 15, creatinine 0.75, sodium 145, potassium 4.7, total protein 6.2, albumin 3.8, total bilirubin 0.4, alkaline phosphatase 99, AST 39, ALT 25, IgG 959.-Labs 3/16/2021: WBC 5.4, hemoglobin 14.6, platelet 234, BUN 13, creatinine 0.78, sodium 146, potassium 4.3, total protein 6.2, albumin 3.8, total bilirubin 0.4, alkaline phosphatase 77, AST 42, ALT 30.-Labs 02/01/2021: BUN 18, creatinine 0.82, total bilirubin 0.5, alkaline phosphatase 102, AST 48 H, ALT 44 H, WBC 9.0, hemoglobin 15.1, platelet 145. IgG 874.-Labs 01/12/2021: total bilirubin 0.5, alkaline phosphatase 122, AST 48, ALT 48, WBC 8.1, hemoglobin 14, platelets 135.-Labs 12/8/2020: BUN 10, creatinine 0.75, total protein 6, albumin 3.4, total bilirubin 0.5, alkaline phosphatase 131, AST 59, ALT 47-Labs 11/25/2020: WBC 8.0, hemoglobin 14.1, platelets 155. Total bilirubin 0.6, alkaline phosphatase 112, AST 52, ALT 40.-Labs 11/12/2020: IgG 1016. WBC 9.2, hemoglobin 14.7, platelet 206. Total bilirubin 0.6, alkaline phosphatase 124, AST 40, ALT 38.-Labs 10/28/2020: WBC 10.7, hemoglobin 14.1, platelet 210, total bilirubin 0.5, alkaline phosphatase 118, AST 38, ALT 40.-Labs 10/14/2020: total bilirubin 0.8, alkaline phosphatase 139, AST 47, ALT 52.-Labs 9/30/2020: total bilirubin 0.8, alkaline phosphatase 149, AST 48, ALT 56. WBC 10.0, hemoglobin 13.9,, platelet 130, IgG 1241.-Labs 9/23/2020: TPMT enzyme activity normal. total bilirubin 0.9, alkaline phosphatase 166, AST 51, ALT 63. IgG 1590.-Labs 9/16/2020: total bilirubin 1.1, direct bilirubin 0.60, alkaline phosphatase 218, AST 65, ALT 74. IgG 1990.-Labs 9/8/2020: IgG 2604. WBC 9.9, hemoglobin 14.2, platelet 364.-Lab 8/28/2020: WBC 11.7, hemoglobin 13.3, platelet 208, total bilirubin 1.7, alkaline phosphatase 168, AST 95, . H pyloti ab neg. -Labs 8/17/2020: IgG 2102 H, ceruloplasmin 24.7, hep B surface antibody positive, anti-smooth muscle antibody 54 H, anti-mitochondrial antibody negative, hep A IgM negative, alpha-1 antitrypsin 135 MZ, ANCA panel negative, EDUARDO negative, hep B core antibody negative, hep B surface antigen negative, hep C antibody negative,-Labs 7/29/2020: Total bilirubin 3.6, alkaline phosphatase 254, , , total protein 6.8, albumin 3.0, creatinine 0.91, BUN 17, hereditary hemochromatosis negative.-Labs 7/28/2020: pancreatic elastase, fecal 315 normal.-Labs 7/27/2020: WBC 5.6, hemoglobin 14.1, platelet 242, fecal pancreatic elastase 315 normal, ferritin 4121, vitamin B12 higher >2000, iron saturation 91%.-Labs 1/8/2019: total protein 6.2, albumin 3.5, total bilirubin 0.6, alkaline phosphatase 122, AST 38 H, ALT 61 H.-Labs 1/9/2019 total bilirubin 0.6, alkaline phosphatase 110, AST 25, ALT 36.IMAGES:-CT abdomen and pelvis with contrast 8/26/2020: status post gastric bypass. Small hiatal hernia. Pneumoperitoneum consistent with the bowel perforation. Sigmoid diverticulosis. Abdominal ascites. Normal pancreas. Normal spleen with normal size. Normal liver.-Abdominal ultrasound 8/21/2020: No liver mass. Normal biliary duct. Patent portal vein. Status post cholecystectomy. Normal spleen, 12 cm. Normal pancreas. No ascites.LIVER BIOPSY:-Pathology liver wedge biopsy 8/31/2020: worrisome for cirrhosis. Nodular architecture and chronic portal inflammatory infiltrate predominantly lymphocytes with plasma cells. No significant lobular or interface activity. No significant steatosis or ballooning degeneration. No granulomas. Bridging fibrosis. Mild increase in iron stores., -

## 2022-10-17 NOTE — HPI-TODAY'S VISIT:
72-year-old female presents for follow-up of autoimmune hepatitis, history of perforated marginal ulcer, SIBO, EPI. She takes Imuran without problem.  She is on pantoprazole 40 mg, wondering if she can take 20 mg in place. She moves bowel 2-3 times a day, loose stools all in the morning regardless eating or not.  As needed Lomotil use working great. She takes Creon for EPI, dose dependent on her diet.

## 2022-11-16 NOTE — PHYSICAL EXAM PSYCH:
normal mood with appropriate affect Posterior Auricular Interpolation Flap Text: A decision was made to reconstruct the defect utilizing an interpolation axial flap and a staged reconstruction.  A telfa template was made of the defect.  This telfa template was then used to outline the posterior auricular interpolation flap.  The donor area for the pedicle flap was then injected with anesthesia.  The flap was excised through the skin and subcutaneous tissue down to the layer of the underlying musculature.  The pedicle flap was carefully excised within this deep plane to maintain its blood supply.  The edges of the donor site were undermined.   The donor site was closed in a primary fashion.  The pedicle was then rotated into position and sutured.  Once the tube was sutured into place, adequate blood supply was confirmed with blanching and refill.  The pedicle was then wrapped with xeroform gauze and dressed appropriately with a telfa and gauze bandage to ensure continued blood supply and protect the attached pedicle.

## 2022-11-27 ENCOUNTER — WEB ENCOUNTER (OUTPATIENT)
Dept: URBAN - METROPOLITAN AREA CLINIC 23 | Facility: CLINIC | Age: 72
End: 2022-11-27

## 2022-11-27 RX ORDER — AZATHIOPRINE 75 MG/1
AS DIRECTED TABLET ORAL ONCE DAILY
Qty: 90 | Refills: 3

## 2022-11-27 RX ORDER — AZATHIOPRINE 75 MG/1
AS DIRECTED TABLET ORAL ONCE DAILY
Qty: 30 | Refills: 0 | OUTPATIENT
Start: 2022-11-29 | End: 2022-12-29

## 2022-12-12 ENCOUNTER — WEB ENCOUNTER (OUTPATIENT)
Dept: URBAN - METROPOLITAN AREA CLINIC 23 | Facility: CLINIC | Age: 72
End: 2022-12-12

## 2022-12-12 RX ORDER — PANCRELIPASE 36000; 180000; 114000 [USP'U]/1; [USP'U]/1; [USP'U]/1
AS DIRECTED CAPSULE, DELAYED RELEASE PELLETS ORAL
Qty: 600 | Refills: 3
Start: 2022-10-17 | End: 2023-12-07

## 2022-12-16 ENCOUNTER — WEB ENCOUNTER (OUTPATIENT)
Dept: URBAN - METROPOLITAN AREA CLINIC 23 | Facility: CLINIC | Age: 72
End: 2022-12-16

## 2022-12-16 RX ORDER — METRONIDAZOLE 500 MG/1
1 TABLET TABLET ORAL THREE TIMES A DAY
Qty: 30 | OUTPATIENT
Start: 2022-12-16 | End: 2022-12-26

## 2023-03-30 ENCOUNTER — WEB ENCOUNTER (OUTPATIENT)
Dept: URBAN - METROPOLITAN AREA CLINIC 23 | Facility: CLINIC | Age: 73
End: 2023-03-30

## 2023-04-05 LAB
A/G RATIO: 1.6
ALBUMIN: 3.9
ALKALINE PHOSPHATASE: 69
ALT (SGPT): 16
AST (SGOT): 23
BILIRUBIN, TOTAL: 0.4
BUN/CREATININE RATIO: (no result)
BUN: 15
CALCIUM: 9.6
CARBON DIOXIDE, TOTAL: 28
CHLORIDE: 108
CREATININE: 0.78
EGFR: 79
GLOBULIN, TOTAL: 2.5
GLUCOSE: 134
HEMATOCRIT: 40
HEMOGLOBIN: 13.9
IMMUNOGLOBULIN G, QN, SERUM: 794
MCH: 32.2
MCHC: 34.8
MCV: 92.6
MPV: 9.7
PLATELET COUNT: 224
POTASSIUM: 4.3
PROTEIN, TOTAL: 6.4
RDW: 12.7
RED BLOOD CELL COUNT: 4.32
SODIUM: 144
WHITE BLOOD CELL COUNT: 5

## 2023-04-17 ENCOUNTER — OFFICE VISIT (OUTPATIENT)
Dept: URBAN - METROPOLITAN AREA CLINIC 23 | Facility: CLINIC | Age: 73
End: 2023-04-17
Payer: MEDICARE

## 2023-04-17 VITALS — WEIGHT: 157.6 LBS | BODY MASS INDEX: 26.26 KG/M2 | TEMPERATURE: 97.3 F | HEIGHT: 65 IN

## 2023-04-17 DIAGNOSIS — K86.1 ACUTE ON CHRONIC PANCREATITIS: ICD-10-CM

## 2023-04-17 DIAGNOSIS — K21.00 GASTROESOPHAGEAL REFLUX DISEASE WITH ESOPHAGITIS, UNSPECIFIED WHETHER HEMORRHAGE: ICD-10-CM

## 2023-04-17 DIAGNOSIS — K63.89 SMALL INTESTINAL BACTERIAL OVERGROWTH (SIBO): ICD-10-CM

## 2023-04-17 DIAGNOSIS — K75.4 AUTOIMMUNE HEPATITIS: ICD-10-CM

## 2023-04-17 PROBLEM — 302866003: Status: ACTIVE | Noted: 2023-04-17

## 2023-04-17 PROCEDURE — 99214 OFFICE O/P EST MOD 30 MIN: CPT | Performed by: INTERNAL MEDICINE

## 2023-04-17 RX ORDER — AZATHIOPRINE 75 MG/1
AS DIRECTED TABLET ORAL ONCE DAILY
Qty: 90 | Refills: 3
End: 2024-04-11

## 2023-04-17 RX ORDER — DULOXETINE HYDROCHLORIDE 30 MG/1
1 CAPSULE CAPSULE, DELAYED RELEASE ORAL ONCE A DAY
Status: ACTIVE | COMMUNITY

## 2023-04-17 RX ORDER — GABAPENTIN 300 MG/1
1 CAPSULE CAPSULE ORAL ONCE A DAY
Status: ACTIVE | COMMUNITY

## 2023-04-17 RX ORDER — BRIMONIDINE TARTRATE, TIMOLOL MALEATE 2; 5 MG/ML; MG/ML
1 DROP INTO AFFECTED EYE SOLUTION/ DROPS OPHTHALMIC TWICE A DAY
Status: ACTIVE | COMMUNITY

## 2023-04-17 RX ORDER — PANTOPRAZOLE SODIUM 40 MG/1
1 TABLET TABLET, DELAYED RELEASE ORAL ONCE A DAY
Qty: 90 TABLET | Refills: 3 | Status: ACTIVE | COMMUNITY

## 2023-04-17 RX ORDER — LIFITEGRAST 50 MG/ML
1 DROP INTO AFFECTED EYE SOLUTION/ DROPS OPHTHALMIC TWICE A DAY
Status: ACTIVE | COMMUNITY

## 2023-04-17 RX ORDER — DENOSUMAB 60 MG/ML
AS DIRECTED INJECTION SUBCUTANEOUS
Status: ACTIVE | COMMUNITY

## 2023-04-17 RX ORDER — PANTOPRAZOLE SODIUM 20 MG/1
1 TABLET TABLET, DELAYED RELEASE ORAL ONCE A DAY
Qty: 90 TABLET | Refills: 3 | Status: ACTIVE | COMMUNITY
Start: 2022-10-17

## 2023-04-17 RX ORDER — PANCRELIPASE 36000; 180000; 114000 [USP'U]/1; [USP'U]/1; [USP'U]/1
AS DIRECTED CAPSULE, DELAYED RELEASE PELLETS ORAL
Qty: 600 | Refills: 3 | Status: ACTIVE | COMMUNITY
Start: 2022-10-17 | End: 2023-12-07

## 2023-04-17 RX ORDER — AZATHIOPRINE 75 MG/1
AS DIRECTED TABLET ORAL ONCE DAILY
Qty: 90 | Refills: 3 | Status: ACTIVE | COMMUNITY

## 2023-04-17 RX ORDER — PANCRELIPASE 36000; 180000; 114000 [USP'U]/1; [USP'U]/1; [USP'U]/1
AS DIRECTED CAPSULE, DELAYED RELEASE PELLETS ORAL
Qty: 750 | Refills: 3 | Status: ACTIVE | COMMUNITY

## 2023-04-17 RX ORDER — METRONIDAZOLE 500 MG/1
1 TABLET TABLET ORAL THREE TIMES A DAY
Qty: 30 | Refills: 1 | OUTPATIENT
Start: 2023-04-17 | End: 2023-05-07

## 2023-04-17 RX ORDER — PANTOPRAZOLE SODIUM 20 MG/1
1 TABLET TABLET, DELAYED RELEASE ORAL ONCE A DAY
Qty: 90 TABLET | Refills: 3 | OUTPATIENT

## 2023-04-17 NOTE — HPI-TODAY'S VISIT:
73-year-old female presents for follow-up.  Her GI issues including autoimmune hepatitis, history of marginal ulcer with perforation, EPI, SIBO. With recurrent symptoms for SIBO, she has started Flagyl.  The symptom resolved and constipated in few days. She is taking Creon with variable frequency and dosage. She is taking pantoprazole daily.  20 mg. She is reporting intermittent hypoglycemic episodes.  She has no follow-up with endocrinologist.  Denies diabetes or antidiabetic medication use.

## 2023-04-17 NOTE — PREVIOUS WORKUP REVIEWED
.REVIEWED EXTERNAL MEDICAL RECORDENDOSCOPIES:-Push enteroscopy 10/20/2020: down to JJ anastomosis. Healthy GJ and JJ anastomosis. Normal exam of the esophagus and the gastric pouch. -Colonoscopy 10/20/2020: hemorrhoids. Normal TI. A 3 mm polyp in the ascending colon. Diverticulosis in the sigmoid colon. Random colon biopsy taken to rule out microscopic colitis. A scar at the ileocecal valve.*Pathology: random gastric-minimal chronic inflammation, negative H. pylori. Ascending colon polyp-tubular adenoma. Random colon biopsy-normal.LABS: -Labs 4/4/2023: IgG 794, BUN 15, creatinine 0.78, total protein 6.4, albumin 3.9, total bilirubin 0.4, alkaline phosphatase 69, AST 23, ALT 16, WBC 5, hemoglobin 13.9, platelet 224.-Labs 10/3/2022: WBC 5.1, hemoglobin 13.8, platelet 289, BUN 14, creatinine 0.7, sodium 142, potassium 4.7, total protein 6.4, abdomen 3.9, total bilirubin 0.4, alkaline phosphatase 68, AST 26, ALT 18, IgG 863, iron saturation 26%, vitamin B12 558, vitamin D 64.  Total cholesterol 182, LDL 85, hemoglobin A1c 5.2.   H.-Labs 4/12/2022:BUN 14, creatinine 0.79, total protein 6.9, albumin 4.5, total bilirubin 0.5, alkaline phosphatase 71, AST 33, ALT 19, WBC 4.9, hemoglobin 15.9, platelet TNP.  IgG 863.-Labs 9/22/2021: WBC 5.1, hemoglobin 13.5, platelet 220, total bilirubin 0.3, alkaline phosphatase 80, AST 36, ALT 24.  Total protein 6.8, albumin 4.4, IgG 933.-Labs 4/26/2021: WBC 4.6, hemoglobin 13.1, platelet 232, BUN 15, creatinine 0.75, sodium 145, potassium 4.7, total protein 6.2, albumin 3.8, total bilirubin 0.4, alkaline phosphatase 99, AST 39, ALT 25, IgG 959.-Labs 3/16/2021: WBC 5.4, hemoglobin 14.6, platelet 234, BUN 13, creatinine 0.78, sodium 146, potassium 4.3, total protein 6.2, albumin 3.8, total bilirubin 0.4, alkaline phosphatase 77, AST 42, ALT 30.-Labs 02/01/2021: BUN 18, creatinine 0.82, total bilirubin 0.5, alkaline phosphatase 102, AST 48 H, ALT 44 H, WBC 9.0, hemoglobin 15.1, platelet 145. IgG 874.-Labs 01/12/2021: total bilirubin 0.5, alkaline phosphatase 122, AST 48, ALT 48, WBC 8.1, hemoglobin 14, platelets 135.-Labs 12/8/2020: BUN 10, creatinine 0.75, total protein 6, albumin 3.4, total bilirubin 0.5, alkaline phosphatase 131, AST 59, ALT 47-Labs 11/25/2020: WBC 8.0, hemoglobin 14.1, platelets 155. Total bilirubin 0.6, alkaline phosphatase 112, AST 52, ALT 40.-Labs 11/12/2020: IgG 1016. WBC 9.2, hemoglobin 14.7, platelet 206. Total bilirubin 0.6, alkaline phosphatase 124, AST 40, ALT 38.-Labs 10/28/2020: WBC 10.7, hemoglobin 14.1, platelet 210, total bilirubin 0.5, alkaline phosphatase 118, AST 38, ALT 40.-Labs 10/14/2020: total bilirubin 0.8, alkaline phosphatase 139, AST 47, ALT 52.-Labs 9/30/2020: total bilirubin 0.8, alkaline phosphatase 149, AST 48, ALT 56. WBC 10.0, hemoglobin 13.9,, platelet 130, IgG 1241.-Labs 9/23/2020: TPMT enzyme activity normal. total bilirubin 0.9, alkaline phosphatase 166, AST 51, ALT 63. IgG 1590.-Labs 9/16/2020: total bilirubin 1.1, direct bilirubin 0.60, alkaline phosphatase 218, AST 65, ALT 74. IgG 1990.-Labs 9/8/2020: IgG 2604. WBC 9.9, hemoglobin 14.2, platelet 364.-Lab 8/28/2020: WBC 11.7, hemoglobin 13.3, platelet 208, total bilirubin 1.7, alkaline phosphatase 168, AST 95, . H pyloti ab neg. -Labs 8/17/2020: IgG 2102 H, ceruloplasmin 24.7, hep B surface antibody positive, anti-smooth muscle antibody 54 H, anti-mitochondrial antibody negative, hep A IgM negative, alpha-1 antitrypsin 135 MZ, ANCA panel negative, EDUARDO negative, hep B core antibody negative, hep B surface antigen negative, hep C antibody negative,-Labs 7/29/2020: Total bilirubin 3.6, alkaline phosphatase 254, , , total protein 6.8, albumin 3.0, creatinine 0.91, BUN 17, hereditary hemochromatosis negative.-Labs 7/28/2020: pancreatic elastase, fecal 315 normal.-Labs 7/27/2020: WBC 5.6, hemoglobin 14.1, platelet 242, fecal pancreatic elastase 315 normal, ferritin 4121, vitamin B12 higher >2000, iron saturation 91%.-Labs 1/8/2019: total protein 6.2, albumin 3.5, total bilirubin 0.6, alkaline phosphatase 122, AST 38 H, ALT 61 H.-Labs 1/9/2019 total bilirubin 0.6, alkaline phosphatase 110, AST 25, ALT 36.IMAGES:-CT abdomen and pelvis with contrast 8/26/2020: status post gastric bypass. Small hiatal hernia. Pneumoperitoneum consistent with the bowel perforation. Sigmoid diverticulosis. Abdominal ascites. Normal pancreas. Normal spleen with normal size. Normal liver.-Abdominal ultrasound 8/21/2020: No liver mass. Normal biliary duct. Patent portal vein. Status post cholecystectomy. Normal spleen, 12 cm. Normal pancreas. No ascites.LIVER BIOPSY:-Pathology liver wedge biopsy 8/31/2020: worrisome for cirrhosis. Nodular architecture and chronic portal inflammatory infiltrate predominantly lymphocytes with plasma cells. No significant lobular or interface activity. No significant steatosis or ballooning degeneration. No granulomas. Bridging fibrosis. Mild increase in iron stores., - , -

## 2023-05-15 ENCOUNTER — WEB ENCOUNTER (OUTPATIENT)
Dept: URBAN - METROPOLITAN AREA CLINIC 23 | Facility: CLINIC | Age: 73
End: 2023-05-15

## 2023-06-13 ENCOUNTER — WEB ENCOUNTER (OUTPATIENT)
Dept: URBAN - METROPOLITAN AREA CLINIC 23 | Facility: CLINIC | Age: 73
End: 2023-06-13

## 2023-06-13 RX ORDER — METRONIDAZOLE 500 MG/1
1 TABLET TABLET ORAL THREE TIMES A DAY
Qty: 42 TABLET | Refills: 0 | OUTPATIENT
Start: 2023-06-14 | End: 2023-06-28

## 2023-10-16 ENCOUNTER — OFFICE VISIT (OUTPATIENT)
Dept: URBAN - METROPOLITAN AREA CLINIC 23 | Facility: CLINIC | Age: 73
End: 2023-10-16

## 2023-10-18 ENCOUNTER — TELEPHONE ENCOUNTER (OUTPATIENT)
Dept: URBAN - METROPOLITAN AREA CLINIC 92 | Facility: CLINIC | Age: 73
End: 2023-10-18

## 2023-10-20 LAB
A/G RATIO: 2
ALBUMIN: 3.9
ALKALINE PHOSPHATASE: 66
ALT (SGPT): 15
AST (SGOT): 25
BILIRUBIN, TOTAL: 0.5
BUN/CREATININE RATIO: (no result)
BUN: 11
CALCIUM: 8.7
CARBON DIOXIDE, TOTAL: 25
CHLORIDE: 108
CREATININE: 0.8
EGFR: 77
GLOBULIN, TOTAL: 2
GLUCOSE: 141
HEMATOCRIT: 41.3
HEMOGLOBIN: 14.2
IMMUNOGLOBULIN G, QN, SERUM: 767
MCH: 32.5
MCHC: 34.4
MCV: 94.5
MPV: 9.8
PLATELET COUNT: 209
POTASSIUM: 4.3
PROTEIN, TOTAL: 5.9
RDW: 12.8
RED BLOOD CELL COUNT: 4.37
SODIUM: 143
WHITE BLOOD CELL COUNT: 4.6

## 2023-10-23 ENCOUNTER — OFFICE VISIT (OUTPATIENT)
Dept: URBAN - METROPOLITAN AREA CLINIC 23 | Facility: CLINIC | Age: 73
End: 2023-10-23
Payer: MEDICARE

## 2023-10-23 ENCOUNTER — DASHBOARD ENCOUNTERS (OUTPATIENT)
Age: 73
End: 2023-10-23

## 2023-10-23 VITALS
HEIGHT: 65 IN | TEMPERATURE: 98 F | BODY MASS INDEX: 27.06 KG/M2 | DIASTOLIC BLOOD PRESSURE: 69 MMHG | WEIGHT: 162.4 LBS | SYSTOLIC BLOOD PRESSURE: 100 MMHG | HEART RATE: 68 BPM

## 2023-10-23 DIAGNOSIS — K75.4 AUTOIMMUNE HEPATITIS: ICD-10-CM

## 2023-10-23 DIAGNOSIS — K86.81 EXOCRINE PANCREATIC INSUFFICIENCY: ICD-10-CM

## 2023-10-23 DIAGNOSIS — K21.9 CHRONIC GERD: ICD-10-CM

## 2023-10-23 DIAGNOSIS — K74.00 LIVER FIBROSIS: ICD-10-CM

## 2023-10-23 DIAGNOSIS — K63.89 SMALL INTESTINAL BACTERIAL OVERGROWTH (SIBO): ICD-10-CM

## 2023-10-23 PROBLEM — 235595009: Status: ACTIVE | Noted: 2023-10-23

## 2023-10-23 PROBLEM — 62484002: Status: ACTIVE | Noted: 2023-10-23

## 2023-10-23 PROCEDURE — 99214 OFFICE O/P EST MOD 30 MIN: CPT | Performed by: INTERNAL MEDICINE

## 2023-10-23 RX ORDER — BRIMONIDINE TARTRATE, TIMOLOL MALEATE 2; 5 MG/ML; MG/ML
1 DROP INTO AFFECTED EYE SOLUTION/ DROPS OPHTHALMIC TWICE A DAY
Status: ACTIVE | COMMUNITY

## 2023-10-23 RX ORDER — METRONIDAZOLE 500 MG/1
1 TABLET TABLET ORAL THREE TIMES A DAY
Qty: 30 | Refills: 1
Start: 2023-04-17 | End: 2023-11-12

## 2023-10-23 RX ORDER — LIFITEGRAST 50 MG/ML
1 DROP INTO AFFECTED EYE SOLUTION/ DROPS OPHTHALMIC TWICE A DAY
Status: ACTIVE | COMMUNITY

## 2023-10-23 RX ORDER — PANTOPRAZOLE SODIUM 20 MG/1
1 TABLET TABLET, DELAYED RELEASE ORAL ONCE A DAY
Qty: 90 TABLET | Refills: 3 | Status: ACTIVE | COMMUNITY

## 2023-10-23 RX ORDER — AZATHIOPRINE 75 MG/1
AS DIRECTED TABLET ORAL ONCE DAILY
Qty: 90 | Refills: 3 | Status: ACTIVE | COMMUNITY
End: 2024-04-11

## 2023-10-23 RX ORDER — GABAPENTIN 300 MG/1
1 CAPSULE CAPSULE ORAL ONCE A DAY
Status: ACTIVE | COMMUNITY

## 2023-10-23 RX ORDER — DENOSUMAB 60 MG/ML
AS DIRECTED INJECTION SUBCUTANEOUS
Status: ACTIVE | COMMUNITY

## 2023-10-23 RX ORDER — DULOXETINE HYDROCHLORIDE 30 MG/1
1 CAPSULE CAPSULE, DELAYED RELEASE ORAL ONCE A DAY
Status: ACTIVE | COMMUNITY

## 2023-10-23 NOTE — PREVIOUS WORKUP REVIEWED
REVIEWED EXTERNAL MEDICAL RECORDENDOSCOPIES:-Push enteroscopy 10/20/2020: down to JJ anastomosis. Healthy GJ and JJ anastomosis. Normal exam of the esophagus and the gastric pouch.-Colonoscopy 10/20/2020: hemorrhoids. Normal TI. A 3 mm polyp in the ascending colon. Diverticulosis in the sigmoid colon. Random colon biopsy taken to rule out microscopic colitis. A scar at the ileocecal valve.*Pathology: random gastric-minimal chronic inflammation, negative H. pylori. Ascending colon polyp-tubular adenoma. Random colon biopsy-normal.LABS:-Labs 10/19/2023: IgG 767, BUN 11, creatinine 0.8, total bilirubin 0.5, alkaline phosphatase 66, AST 25, ALT 15, WBC 4.6, hemoglobin 14.2, platelet 209.-Labs 4/4/2023: IgG 794, BUN 15, creatinine 0.78, total protein 6.4, albumin 3.9, total bilirubin 0.4, alkaline phosphatase 69, AST 23, ALT 16, WBC 5, hemoglobin 13.9, platelet 224.-Labs 10/3/2022: WBC 5.1, hemoglobin 13.8, platelet 289, BUN 14, creatinine 0.7, sodium 142, potassium 4.7, total protein 6.4, abdomen 3.9, total bilirubin 0.4, alkaline phosphatase 68, AST 26, ALT 18, IgG 863, iron saturation 26%, vitamin B12 558, vitamin D 64.  Total cholesterol 182, LDL 85, hemoglobin A1c 5.2.   H.-Labs 4/12/2022:BUN 14, creatinine 0.79, total protein 6.9, albumin 4.5, total bilirubin 0.5, alkaline phosphatase 71, AST 33, ALT 19, WBC 4.9, hemoglobin 15.9, platelet TNP.  IgG 863.-Labs 9/22/2021: WBC 5.1, hemoglobin 13.5, platelet 220, total bilirubin 0.3, alkaline phosphatase 80, AST 36, ALT 24.  Total protein 6.8, albumin 4.4, IgG 933.-Labs 4/26/2021: WBC 4.6, hemoglobin 13.1, platelet 232, BUN 15, creatinine 0.75, sodium 145, potassium 4.7, total protein 6.2, albumin 3.8, total bilirubin 0.4, alkaline phosphatase 99, AST 39, ALT 25, IgG 959.-Labs 3/16/2021: WBC 5.4, hemoglobin 14.6, platelet 234, BUN 13, creatinine 0.78, sodium 146, potassium 4.3, total protein 6.2, albumin 3.8, total bilirubin 0.4, alkaline phosphatase 77, AST 42, ALT 30.-Labs 02/01/2021: BUN 18, creatinine 0.82, total bilirubin 0.5, alkaline phosphatase 102, AST 48 H, ALT 44 H, WBC 9.0, hemoglobin 15.1, platelet 145. IgG 874.-Labs 01/12/2021: total bilirubin 0.5, alkaline phosphatase 122, AST 48, ALT 48, WBC 8.1, hemoglobin 14, platelets 135.-Labs 12/8/2020: BUN 10, creatinine 0.75, total protein 6, albumin 3.4, total bilirubin 0.5, alkaline phosphatase 131, AST 59, ALT 47-Labs 11/25/2020: WBC 8.0, hemoglobin 14.1, platelets 155. Total bilirubin 0.6, alkaline phosphatase 112, AST 52, ALT 40.-Labs 11/12/2020: IgG 1016. WBC 9.2, hemoglobin 14.7, platelet 206. Total bilirubin 0.6, alkaline phosphatase 124, AST 40, ALT 38.-Labs 10/28/2020: WBC 10.7, hemoglobin 14.1, platelet 210, total bilirubin 0.5, alkaline phosphatase 118, AST 38, ALT 40.-Labs 10/14/2020: total bilirubin 0.8, alkaline phosphatase 139, AST 47, ALT 52.-Labs 9/30/2020: total bilirubin 0.8, alkaline phosphatase 149, AST 48, ALT 56. WBC 10.0, hemoglobin 13.9,, platelet 130, IgG 1241.-Labs 9/23/2020: TPMT enzyme activity normal. total bilirubin 0.9, alkaline phosphatase 166, AST 51, ALT 63. IgG 1590.-Labs 9/16/2020: total bilirubin 1.1, direct bilirubin 0.60, alkaline phosphatase 218, AST 65, ALT 74. IgG 1990.-Labs 9/8/2020: IgG 2604. WBC 9.9, hemoglobin 14.2, platelet 364.-Lab 8/28/2020: WBC 11.7, hemoglobin 13.3, platelet 208, total bilirubin 1.7, alkaline phosphatase 168, AST 95, . H pyloti ab neg. -Labs 8/17/2020: IgG 2102 H, ceruloplasmin 24.7, hep B surface antibody positive, anti-smooth muscle antibody 54 H, anti-mitochondrial antibody negative, hep A IgM negative, alpha-1 antitrypsin 135 MZ, ANCA panel negative, EDUARDO negative, hep B core antibody negative, hep B surface antigen negative, hep C antibody negative,-Labs 7/29/2020: Total bilirubin 3.6, alkaline phosphatase 254, , , total protein 6.8, albumin 3.0, creatinine 0.91, BUN 17, hereditary hemochromatosis negative.-Labs 7/28/2020: pancreatic elastase, fecal 315 normal.-Labs 7/27/2020: WBC 5.6, hemoglobin 14.1, platelet 242, fecal pancreatic elastase 315 normal, ferritin 4121, vitamin B12 higher >2000, iron saturation 91%.-Labs 1/8/2019: total protein 6.2, albumin 3.5, total bilirubin 0.6, alkaline phosphatase 122, AST 38 H, ALT 61 H.-Labs 1/9/2019 total bilirubin 0.6, alkaline phosphatase 110, AST 25, ALT 36.IMAGES:-CT abdomen and pelvis with contrast 8/26/2020: status post gastric bypass. Small hiatal hernia. Pneumoperitoneum consistent with the bowel perforation. Sigmoid diverticulosis. Abdominal ascites. Normal pancreas. Normal spleen with normal size. Normal liver.-Abdominal ultrasound 8/21/2020: No liver mass. Normal biliary duct. Patent portal vein. Status post cholecystectomy. Normal spleen, 12 cm. Normal pancreas. No ascites.LIVER BIOPSY:-Pathology liver wedge biopsy 8/31/2020: worrisome for cirrhosis. Nodular architecture and chronic portal inflammatory infiltrate predominantly lymphocytes with plasma cells. No significant lobular or interface activity. No significant steatosis or ballooning degeneration. No granulomas. Bridging fibrosis. Mild increase in iron stores., - , -

## 2023-10-23 NOTE — HPI-TODAY'S VISIT:
73-year-old female presents for follow-up.  Autoimmune hepatitis, GERD, EPI, SIBO. Currently she is taking Flagyl.  Symptoms of bloating, loose stools.  She takes align probiotics. She also takes Gas-X and Lomotil as needed for GI symptoms. She has been taking Creon as needed only.  She is wondering if she can stop taking it. She is on 20 mg pantoprazole, chronic GERD, suspecting causing chronic cough.  Currently asymptomatic. She reports good compliance with azathioprine 75 mg. She is up-to-date vaccinations this year.

## 2023-12-12 ENCOUNTER — WEB ENCOUNTER (OUTPATIENT)
Dept: URBAN - METROPOLITAN AREA CLINIC 23 | Facility: CLINIC | Age: 73
End: 2023-12-12

## 2024-01-19 ENCOUNTER — TELEPHONE ENCOUNTER (OUTPATIENT)
Dept: URBAN - METROPOLITAN AREA CLINIC 78 | Facility: CLINIC | Age: 74
End: 2024-01-19

## 2024-01-19 ENCOUNTER — TELEPHONE ENCOUNTER (OUTPATIENT)
Dept: URBAN - METROPOLITAN AREA CLINIC 92 | Facility: CLINIC | Age: 74
End: 2024-01-19

## 2024-01-19 RX ORDER — AZATHIOPRINE 75 MG/1
AS DIRECTED TABLET ORAL ONCE DAILY
Qty: 90 | Refills: 3
End: 2025-01-13

## 2024-01-23 ENCOUNTER — TELEPHONE ENCOUNTER (OUTPATIENT)
Dept: URBAN - METROPOLITAN AREA CLINIC 92 | Facility: CLINIC | Age: 74
End: 2024-01-23

## 2024-01-26 ENCOUNTER — TELEPHONE ENCOUNTER (OUTPATIENT)
Dept: URBAN - METROPOLITAN AREA CLINIC 92 | Facility: CLINIC | Age: 74
End: 2024-01-26

## 2024-06-30 ENCOUNTER — WEB ENCOUNTER (OUTPATIENT)
Dept: URBAN - METROPOLITAN AREA CLINIC 23 | Facility: CLINIC | Age: 74
End: 2024-06-30

## 2024-08-12 ENCOUNTER — OFFICE VISIT (OUTPATIENT)
Dept: URBAN - METROPOLITAN AREA CLINIC 23 | Facility: CLINIC | Age: 74
End: 2024-08-12
Payer: MEDICARE

## 2024-08-12 VITALS
SYSTOLIC BLOOD PRESSURE: 135 MMHG | HEART RATE: 76 BPM | BODY MASS INDEX: 27.16 KG/M2 | HEIGHT: 65 IN | TEMPERATURE: 97.8 F | DIASTOLIC BLOOD PRESSURE: 80 MMHG | WEIGHT: 163 LBS

## 2024-08-12 DIAGNOSIS — R19.7 CHRONIC DIARRHEA: ICD-10-CM

## 2024-08-12 DIAGNOSIS — K75.4 AUTOIMMUNE HEPATITIS: ICD-10-CM

## 2024-08-12 PROCEDURE — 99214 OFFICE O/P EST MOD 30 MIN: CPT | Performed by: INTERNAL MEDICINE

## 2024-08-12 RX ORDER — BRIMONIDINE TARTRATE, TIMOLOL MALEATE 2; 5 MG/ML; MG/ML
1 DROP INTO AFFECTED EYE SOLUTION/ DROPS OPHTHALMIC TWICE A DAY
Status: ACTIVE | COMMUNITY

## 2024-08-12 RX ORDER — LIFITEGRAST 50 MG/ML
1 DROP INTO AFFECTED EYE SOLUTION/ DROPS OPHTHALMIC TWICE A DAY
Status: ACTIVE | COMMUNITY

## 2024-08-12 RX ORDER — DENOSUMAB 60 MG/ML
AS DIRECTED INJECTION SUBCUTANEOUS
Status: ACTIVE | COMMUNITY

## 2024-08-12 RX ORDER — DULOXETINE HYDROCHLORIDE 30 MG/1
1 CAPSULE CAPSULE, DELAYED RELEASE ORAL ONCE A DAY
Status: ACTIVE | COMMUNITY

## 2024-08-12 NOTE — PHYSICAL EXAM CONSTITUTIONAL:
well developed, well nourished , in no acute distress , ambulating without difficulty , normal communication ability , - , - , -

## 2024-08-12 NOTE — PREVIOUS WORKUP REVIEWED EXTERNAL MEDICAL RECORD
REVIEWED EXTERNAL MEDICAL RECORDENDOSCOPIES:-Push enteroscopy 10/20/2020: down to JJ anastomosis. Healthy GJ and JJ anastomosis. Normal exam of the esophagus and the gastric pouch.-Colonoscopy 10/20/2020: hemorrhoids. Normal TI. A 3 mm polyp in the ascending colon. Diverticulosis in the sigmoid colon. Random colon biopsy taken to rule out microscopic colitis. A scar at the ileocecal valve.*Pathology: random gastric-minimal chronic inflammation, negative H. pylori. Ascending colon polyp-tubular adenoma. Random colon biopsy-normal.LABS:-Labs 10/19/2023: IgG 767, BUN 11, creatinine 0.8, total bilirubin 0.5, alkaline phosphatase 66, AST 25, ALT 15, WBC 4.6, hemoglobin 14.2, platelet 209.-Labs 4/4/2023: IgG 794, BUN 15, creatinine 0.78, total protein 6.4, albumin 3.9, total bilirubin 0.4, alkaline phosphatase 69, AST 23, ALT 16, WBC 5, hemoglobin 13.9, platelet 224.-Labs 10/3/2022: WBC 5.1, hemoglobin 13.8, platelet 289, BUN 14, creatinine 0.7, sodium 142, potassium 4.7, total protein 6.4, abdomen 3.9, total bilirubin 0.4, alkaline phosphatase 68, AST 26, ALT 18, IgG 863, iron saturation 26%, vitamin B12 558, vitamin D 64.  Total cholesterol 182, LDL 85, hemoglobin A1c 5.2.   H.-Labs 4/12/2022:BUN 14, creatinine 0.79, total protein 6.9, albumin 4.5, total bilirubin 0.5, alkaline phosphatase 71, AST 33, ALT 19, WBC 4.9, hemoglobin 15.9, platelet TNP.  IgG 863.-Labs 9/22/2021: WBC 5.1, hemoglobin 13.5, platelet 220, total bilirubin 0.3, alkaline phosphatase 80, AST 36, ALT 24.  Total protein 6.8, albumin 4.4, IgG 933.-Labs 4/26/2021: WBC 4.6, hemoglobin 13.1, platelet 232, BUN 15, creatinine 0.75, sodium 145, potassium 4.7, total protein 6.2, albumin 3.8, total bilirubin 0.4, alkaline phosphatase 99, AST 39, ALT 25, IgG 959.-Labs 3/16/2021: WBC 5.4, hemoglobin 14.6, platelet 234, BUN 13, creatinine 0.78, sodium 146, potassium 4.3, total protein 6.2, albumin 3.8, total bilirubin 0.4, alkaline phosphatase 77, AST 42, ALT 30.-Labs 02/01/2021: BUN 18, creatinine 0.82, total bilirubin 0.5, alkaline phosphatase 102, AST 48 H, ALT 44 H, WBC 9.0, hemoglobin 15.1, platelet 145. IgG 874.-Labs 01/12/2021: total bilirubin 0.5, alkaline phosphatase 122, AST 48, ALT 48, WBC 8.1, hemoglobin 14, platelets 135.-Labs 12/8/2020: BUN 10, creatinine 0.75, total protein 6, albumin 3.4, total bilirubin 0.5, alkaline phosphatase 131, AST 59, ALT 47-Labs 11/25/2020: WBC 8.0, hemoglobin 14.1, platelets 155. Total bilirubin 0.6, alkaline phosphatase 112, AST 52, ALT 40.-Labs 11/12/2020: IgG 1016. WBC 9.2, hemoglobin 14.7, platelet 206. Total bilirubin 0.6, alkaline phosphatase 124, AST 40, ALT 38.-Labs 10/28/2020: WBC 10.7, hemoglobin 14.1, platelet 210, total bilirubin 0.5, alkaline phosphatase 118, AST 38, ALT 40.-Labs 10/14/2020: total bilirubin 0.8, alkaline phosphatase 139, AST 47, ALT 52.-Labs 9/30/2020: total bilirubin 0.8, alkaline phosphatase 149, AST 48, ALT 56. WBC 10.0, hemoglobin 13.9,, platelet 130, IgG 1241.-Labs 9/23/2020: TPMT enzyme activity normal. total bilirubin 0.9, alkaline phosphatase 166, AST 51, ALT 63. IgG 1590.-Labs 9/16/2020: total bilirubin 1.1, direct bilirubin 0.60, alkaline phosphatase 218, AST 65, ALT 74. IgG 1990.-Labs 9/8/2020: IgG 2604. WBC 9.9, hemoglobin 14.2, platelet 364.-Lab 8/28/2020: WBC 11.7, hemoglobin 13.3, platelet 208, total bilirubin 1.7, alkaline phosphatase 168, AST 95, . H pyloti ab neg. -Labs 8/17/2020: IgG 2102 H, ceruloplasmin 24.7, hep B surface antibody positive, anti-smooth muscle antibody 54 H, anti-mitochondrial antibody negative, hep A IgM negative, alpha-1 antitrypsin 135 MZ, ANCA panel negative, EDUARDO negative, hep B core antibody negative, hep B surface antigen negative, hep C antibody negative,-Labs 7/29/2020: Total bilirubin 3.6, alkaline phosphatase 254, , , total protein 6.8, albumin 3.0, creatinine 0.91, BUN 17, hereditary hemochromatosis negative.-Labs 7/28/2020: pancreatic elastase, fecal 315 normal.-Labs 7/27/2020: WBC 5.6, hemoglobin 14.1, platelet 242, fecal pancreatic elastase 315 normal, ferritin 4121, vitamin B12 higher >2000, iron saturation 91%.-Labs 1/8/2019: total protein 6.2, albumin 3.5, total bilirubin 0.6, alkaline phosphatase 122, AST 38 H, ALT 61 H.-Labs 1/9/2019 total bilirubin 0.6, alkaline phosphatase 110, AST 25, ALT 36.IMAGES:-CT abdomen and pelvis with contrast 8/26/2020: status post gastric bypass. Small hiatal hernia. Pneumoperitoneum consistent with the bowel perforation. Sigmoid diverticulosis. Abdominal ascites. Normal pancreas. Normal spleen with normal size. Normal liver.-Abdominal ultrasound 8/21/2020: No liver mass. Normal biliary duct. Patent portal vein. Status post cholecystectomy. Normal spleen, 12 cm. Normal pancreas. No ascites.LIVER BIOPSY:-Pathology liver wedge biopsy 8/31/2020: worrisome for cirrhosis. Nodular architecture and chronic portal inflammatory infiltrate predominantly lymphocytes with plasma cells. No significant lobular or interface activity. No significant steatosis or ballooning degeneration. No granulomas. Bridging fibrosis. Mild increase in iron stores., - , - , -

## 2024-08-12 NOTE — HPI-TODAY'S VISIT:
74-year-old female with autoimmune hepatitis, SIBO, EPI, history of C. difficile infection, status post Brittaney-en-Y gastric bypass presents for 6 months history with diarrhea.  Lower abdominal cramping.  Bloating, urgency with fecal incontinence issues.  Lamar Stool Scale type VI-VII.  BM 5-6 times a day. Tried Lomotil, dicyclomine, Flagyl helped. Patient has Creon at home has not used for a while. No previous history of cholestyramine use. She reports good compliance with Imuran. Denies NSAID use.

## 2024-08-15 LAB
A/G RATIO: 1.9
ALBUMIN: 4.1
ALKALINE PHOSPHATASE: 66
ALT (SGPT): 17
AST (SGOT): 27
BILIRUBIN, TOTAL: 0.5
BUN/CREATININE RATIO: (no result)
BUN: 11
CALCIUM: 9.1
CARBON DIOXIDE, TOTAL: 25
CHLORIDE: 105
CLOSTRIDIUM DIFFICILE: (no result)
CREATININE: 0.66
EGFR: 91
GLOBULIN, TOTAL: 2.2
GLUCOSE: 93
HEMATOCRIT: 45.3
HEMOGLOBIN: 15
IMMUNOGLOBULIN G, QN, SERUM: 842
MCH: 32.5
MCHC: 33.1
MCV: 98.3
MPV: 9.5
PLATELET COUNT: 242
POTASSIUM: 4.5
PROTEIN, TOTAL: 6.3
RDW: 13
RED BLOOD CELL COUNT: 4.61
SODIUM: 141
WHITE BLOOD CELL COUNT: 4.8

## 2024-08-26 ENCOUNTER — OFFICE VISIT (OUTPATIENT)
Dept: URBAN - METROPOLITAN AREA CLINIC 23 | Facility: CLINIC | Age: 74
End: 2024-08-26

## 2024-10-14 ENCOUNTER — OFFICE VISIT (OUTPATIENT)
Dept: URBAN - METROPOLITAN AREA CLINIC 23 | Facility: CLINIC | Age: 74
End: 2024-10-14

## 2024-10-14 NOTE — PHYSICAL EXAM CONSTITUTIONAL:
well developed, well nourished , in no acute distress , ambulating without difficulty , normal communication ability , - , -
Patient/Caregiver provided printed discharge information.

## 2024-10-28 ENCOUNTER — OFFICE VISIT (OUTPATIENT)
Dept: URBAN - METROPOLITAN AREA CLINIC 23 | Facility: CLINIC | Age: 74
End: 2024-10-28

## 2025-01-14 ENCOUNTER — WEB ENCOUNTER (OUTPATIENT)
Dept: URBAN - METROPOLITAN AREA CLINIC 23 | Facility: CLINIC | Age: 75
End: 2025-01-14

## 2025-01-14 RX ORDER — AZATHIOPRINE 75 MG/1
AS DIRECTED TABLET ORAL ONCE DAILY
Qty: 90 | Refills: 3
End: 2026-01-12

## 2025-01-16 ENCOUNTER — WEB ENCOUNTER (OUTPATIENT)
Dept: URBAN - METROPOLITAN AREA CLINIC 23 | Facility: CLINIC | Age: 75
End: 2025-01-16

## 2025-03-23 ENCOUNTER — WEB ENCOUNTER (OUTPATIENT)
Dept: URBAN - METROPOLITAN AREA CLINIC 23 | Facility: CLINIC | Age: 75
End: 2025-03-23

## 2025-03-28 ENCOUNTER — WEB ENCOUNTER (OUTPATIENT)
Dept: URBAN - METROPOLITAN AREA CLINIC 23 | Facility: CLINIC | Age: 75
End: 2025-03-28

## 2025-03-31 ENCOUNTER — WEB ENCOUNTER (OUTPATIENT)
Dept: URBAN - METROPOLITAN AREA CLINIC 23 | Facility: CLINIC | Age: 75
End: 2025-03-31

## 2025-03-31 RX ORDER — AZATHIOPRINE 75 MG/1
AS DIRECTED TABLET ORAL ONCE DAILY
Qty: 90 | Refills: 3
End: 2026-03-26

## 2025-04-08 ENCOUNTER — WEB ENCOUNTER (OUTPATIENT)
Dept: URBAN - METROPOLITAN AREA CLINIC 23 | Facility: CLINIC | Age: 75
End: 2025-04-08

## 2025-05-22 ENCOUNTER — WEB ENCOUNTER (OUTPATIENT)
Dept: URBAN - METROPOLITAN AREA CLINIC 23 | Facility: CLINIC | Age: 75
End: 2025-05-22

## 2025-05-23 ENCOUNTER — WEB ENCOUNTER (OUTPATIENT)
Dept: URBAN - METROPOLITAN AREA CLINIC 23 | Facility: CLINIC | Age: 75
End: 2025-05-23

## 2025-05-23 RX ORDER — DIPHENOXYLATE HYDROCHLORIDE AND ATROPINE SULFATE 2.5; .025 MG/1; MG/1
1 TABLET AS NEEDED TABLET ORAL
Qty: 56 TABLET | Refills: 0 | OUTPATIENT
Start: 2025-05-23 | End: 2025-06-06

## 2025-07-10 ENCOUNTER — WEB ENCOUNTER (OUTPATIENT)
Dept: URBAN - METROPOLITAN AREA CLINIC 23 | Facility: CLINIC | Age: 75
End: 2025-07-10

## 2025-07-18 ENCOUNTER — LAB OUTSIDE AN ENCOUNTER (OUTPATIENT)
Dept: URBAN - METROPOLITAN AREA CLINIC 23 | Facility: CLINIC | Age: 75
End: 2025-07-18

## 2025-07-18 ENCOUNTER — OFFICE VISIT (OUTPATIENT)
Dept: URBAN - METROPOLITAN AREA CLINIC 23 | Facility: CLINIC | Age: 75
End: 2025-07-18
Payer: MEDICARE

## 2025-07-18 DIAGNOSIS — K75.4 AUTOIMMUNE HEPATITIS: ICD-10-CM

## 2025-07-18 DIAGNOSIS — K75.81 NASH (NONALCOHOLIC STEATOHEPATITIS): ICD-10-CM

## 2025-07-18 DIAGNOSIS — K86.81 EXOCRINE PANCREATIC INSUFFICIENCY: ICD-10-CM

## 2025-07-18 DIAGNOSIS — Z98.84 HISTORY OF ROUX-EN-Y GASTRIC BYPASS: ICD-10-CM

## 2025-07-18 DIAGNOSIS — K63.89 SMALL INTESTINAL BACTERIAL OVERGROWTH (SIBO): ICD-10-CM

## 2025-07-18 DIAGNOSIS — Z86.0101 HISTORY OF ADENOMATOUS POLYP OF COLON: ICD-10-CM

## 2025-07-18 PROCEDURE — 99214 OFFICE O/P EST MOD 30 MIN: CPT | Performed by: INTERNAL MEDICINE

## 2025-07-18 RX ORDER — PANTOPRAZOLE SODIUM 20 MG/1
1 TABLET 1/2 TO 1 HOUR BEFORE MORNING MEAL TABLET, DELAYED RELEASE ORAL DAILY
Status: ACTIVE | COMMUNITY

## 2025-07-18 RX ORDER — DENOSUMAB 60 MG/ML
AS DIRECTED INJECTION SUBCUTANEOUS
Status: ACTIVE | COMMUNITY

## 2025-07-18 RX ORDER — CIPROFLOXACIN 500 MG/1
1 TABLET TABLET, FILM COATED ORAL TWICE A DAY
Qty: 28 TABLET | Refills: 0 | OUTPATIENT
Start: 2025-07-18 | End: 2025-08-01

## 2025-07-18 RX ORDER — AZATHIOPRINE 75 MG/1
AS DIRECTED TABLET ORAL ONCE DAILY
Qty: 90 | Refills: 3 | Status: ACTIVE | COMMUNITY
End: 2026-03-26

## 2025-07-18 RX ORDER — LIFITEGRAST 50 MG/ML
1 DROP INTO AFFECTED EYE SOLUTION/ DROPS OPHTHALMIC TWICE A DAY
Status: ACTIVE | COMMUNITY

## 2025-07-18 RX ORDER — SODIUM, POTASSIUM,MAG SULFATES 17.5-3.13G
177 ML THE FIRST DOSE THE EVENING BEFORE AND SECOND DOSE 5-8 HRS BEFORE COLONOSCOPY. THIS IS A GENERIC FOR SUPREP SOLUTION, RECONSTITUTED, ORAL ORAL AS DIRECTED
Qty: 354 MILLILITER | Refills: 0 | OUTPATIENT
Start: 2025-07-18 | End: 2025-07-19

## 2025-07-18 RX ORDER — DULOXETINE HYDROCHLORIDE 30 MG/1
1 CAPSULE CAPSULE, DELAYED RELEASE ORAL ONCE A DAY
Status: ACTIVE | COMMUNITY

## 2025-07-18 RX ORDER — BRIMONIDINE TARTRATE, TIMOLOL MALEATE 2; 5 MG/ML; MG/ML
1 DROP INTO AFFECTED EYE SOLUTION/ DROPS OPHTHALMIC TWICE A DAY
Status: ACTIVE | COMMUNITY

## 2025-07-18 NOTE — HPI-TODAY'S VISIT:
Damaris Alcantara, a 75-year-old female, presented for follow-up of her chronic gastrointestinal and liver conditions, as well as planning for a repeat colonoscopy. She described ongoing severe SIBO symptoms, including frequent lower abdominal cramping, urgent diarrhea, and occasional incontinence, which have been particularly troublesome during recent travel. She manages these symptoms with courses of antibiotics, Flagyl. Frequently relapses. Resumed Creon for pancreatic insufficiency, though relief has been incomplete. She reports good tolerance with AZA for AIH, recalling her last liver biopsy was in 2020. Damaris is aware of her history of autoimmune hepatitis and liver fibrosis and recognizes the need for continued monitoring. For colonoscopy planning, she is due for repeat screening due to a prior precancerous polyp.  She continues to use pantoprazole for GERD, following an ENT recommendation dose of 20 mg. Denies abd pain.

## 2025-07-18 NOTE — PREVIOUS WORKUP REVIEWED EXTERNAL MEDICAL RECORD
ENDOSCOPIES: -Push enteroscopy 10/20/2020: down to JJ anastomosis. Healthy GJ and JJ anastomosis. Normal exam of the esophagus and the gastric pouch.-Colonoscopy 10/20/2020: hemorrhoids. Normal TI. A 3 mm polyp in the ascending colon. Diverticulosis in the sigmoid colon. Random colon biopsy taken to rule out microscopic colitis. A scar at the ileocecal valve.*Pathology: random gastric-minimal chronic inflammation, negative H. pylori. Ascending colon polyp-tubular adenoma. Random colon biopsy-normal.  LABS: - Labs 8/13/2024: IgG 842.  BUN 11, creatinine 0.66, total protein 6.3, albumin 4.1, total bilirubin 0.5, alkaline phosphatase 66, AST 27, ALT 17, WBC 4.8, hemoglobin 15, platelet 242.  C. difficile negative. -Labs 10/19/2023: IgG 767, BUN 11, creatinine 0.8, total bilirubin 0.5, alkaline phosphatase 66, AST 25, ALT 15, WBC 4.6, hemoglobin 14.2, platelet 209.-Labs 4/4/2023: IgG 794, BUN 15, creatinine 0.78, total protein 6.4, albumin 3.9, total bilirubin 0.4, alkaline phosphatase 69, AST 23, ALT 16, WBC 5, hemoglobin 13.9, platelet 224. -Labs 10/3/2022: WBC 5.1, hemoglobin 13.8, platelet 289, BUN 14, creatinine 0.7, sodium 142, potassium 4.7, total protein 6.4, abdomen 3.9, total bilirubin 0.4, alkaline phosphatase 68, AST 26, ALT 18, IgG 863, iron saturation 26%, vitamin B12 558, vitamin D 64.  Total cholesterol 182, LDL 85, hemoglobin A1c 5.2.   H.-Labs 4/12/2022:BUN 14, creatinine 0.79, total protein 6.9, albumin 4.5, total bilirubin 0.5, alkaline phosphatase 71, AST 33, ALT 19, WBC 4.9, hemoglobin 15.9, platelet TNP.  IgG 863. -Labs 9/22/2021: WBC 5.1, hemoglobin 13.5, platelet 220, total bilirubin 0.3, alkaline phosphatase 80, AST 36, ALT 24.  Total protein 6.8, albumin 4.4, IgG 933. -Labs 4/26/2021: WBC 4.6, hemoglobin 13.1, platelet 232, BUN 15, creatinine 0.75, sodium 145, potassium 4.7, total protein 6.2, albumin 3.8, total bilirubin 0.4, alkaline phosphatase 99, AST 39, ALT 25, IgG 959.-Labs 3/16/2021: WBC 5.4, hemoglobin 14.6, platelet 234, BUN 13, creatinine 0.78, sodium 146, potassium 4.3, total protein 6.2, albumin 3.8, total bilirubin 0.4, alkaline phosphatase 77, AST 42, ALT 30.-Labs 02/01/2021: BUN 18, creatinine 0.82, total bilirubin 0.5, alkaline phosphatase 102, AST 48 H, ALT 44 H, WBC 9.0, hemoglobin 15.1, platelet 145. IgG 874. -Labs 01/12/2021: total bilirubin 0.5, alkaline phosphatase 122, AST 48, ALT 48, WBC 8.1, hemoglobin 14, platelets 135. -Labs 12/8/2020: BUN 10, creatinine 0.75, total protein 6, albumin 3.4, total bilirubin 0.5, alkaline phosphatase 131, AST 59, ALT 47 -Labs 11/25/2020: WBC 8.0, hemoglobin 14.1, platelets 155. Total bilirubin 0.6, alkaline phosphatase 112, AST 52, ALT 40. -Labs 11/12/2020: IgG 1016. WBC 9.2, hemoglobin 14.7, platelet 206. Total bilirubin 0.6, alkaline phosphatase 124, AST 40, ALT 38.-Labs 10/28/2020: WBC 10.7, hemoglobin 14.1, platelet 210, total bilirubin 0.5, alkaline phosphatase 118, AST 38, ALT 40.-Labs 10/14/2020: total bilirubin 0.8, alkaline phosphatase 139, AST 47, ALT 52.-Labs 9/30/2020: total bilirubin 0.8, alkaline phosphatase 149, AST 48, ALT 56. WBC 10.0, hemoglobin 13.9,, platelet 130, IgG 1241.-Labs 9/23/2020: TPMT enzyme activity normal. total bilirubin 0.9, alkaline phosphatase 166, AST 51, ALT 63. IgG 1590.-Labs 9/16/2020: total bilirubin 1.1, direct bilirubin 0.60, alkaline phosphatase 218, AST 65, ALT 74. IgG 1990.-Labs 9/8/2020: IgG 2604. WBC 9.9, hemoglobin 14.2, platelet 364.-Lab 8/28/2020: WBC 11.7, hemoglobin 13.3, platelet 208, total bilirubin 1.7, alkaline phosphatase 168, AST 95, . H pyloti ab neg. -Labs 8/17/2020: IgG 2102 H, ceruloplasmin 24.7, hep B surface antibody positive, anti-smooth muscle antibody 54 H, anti-mitochondrial antibody negative, hep A IgM negative, alpha-1 antitrypsin 135 MZ, ANCA panel negative, EDUARDO negative, hep B core antibody negative, hep B surface antigen negative, hep C antibody negative,-Labs 7/29/2020: Total bilirubin 3.6, alkaline phosphatase 254, , , total protein 6.8, albumin 3.0, creatinine 0.91, BUN 17, hereditary hemochromatosis negative.-Labs 7/28/2020: pancreatic elastase, fecal 315 normal.-Labs 7/27/2020: WBC 5.6, hemoglobin 14.1, platelet 242, fecal pancreatic elastase 315 normal, ferritin 4121, vitamin B12 higher >2000, iron saturation 91%. -Labs 1/8/2019: total protein 6.2, albumin 3.5, total bilirubin 0.6, alkaline phosphatase 122, AST 38 H, ALT 61 H.-Labs 1/9/2019 total bilirubin 0.6, alkaline phosphatase 110, AST 25, ALT 36.  IMAGES: -CT abdomen and pelvis with contrast 8/26/2020: status post gastric bypass. Small hiatal hernia. Pneumoperitoneum consistent with the bowel perforation. Sigmoid diverticulosis. Abdominal ascites. Normal pancreas. Normal spleen with normal size. Normal liver.-Abdominal ultrasound 8/21/2020: No liver mass. Normal biliary duct. Patent portal vein. Status post cholecystectomy. Normal spleen, 12 cm. Normal pancreas. No ascites. LIVER BIOPSY: -Pathology liver wedge biopsy 8/31/2020: worrisome for cirrhosis. Nodular architecture and chronic portal inflammatory infiltrate predominantly lymphocytes with plasma cells. No significant lobular or interface activity. No significant steatosis or ballooning degeneration. No granulomas. Bridging fibrosis. Mild increase in iron stores. , - , - , - , .

## 2025-07-25 LAB
A/G RATIO: 1.7
ALBUMIN: 4.2
ALKALINE PHOSPHATASE: 114
ALT (SGPT): 16
AST (SGOT): 22
BILIRUBIN, TOTAL: 0.4
BUN/CREATININE RATIO: (no result)
BUN: 14
CALCIUM: 9.2
CARBON DIOXIDE, TOTAL: 23
CHLORIDE: 108
CREATININE: 0.68
EGFR: 90
ENHANCED LIVER FIBROSIS (ELF) SCORE: 11.18
GLOBULIN, TOTAL: 2.5
GLUCOSE: 153
HEMATOCRIT: 45.9
HEMOGLOBIN: 15.2
IMMUNOGLOBULIN G, QN, SERUM: 877
INR: 1
MCH: 32.8
MCHC: 33.1
MCV: 99.1
MPV: 9.5
PLATELET COUNT: 257
POTASSIUM: 4.2
PROTEIN, TOTAL: 6.7
PT: 10.1
RDW: 13
RED BLOOD CELL COUNT: 4.63
SODIUM: 141
WHITE BLOOD CELL COUNT: 5

## 2025-07-29 ENCOUNTER — TELEPHONE ENCOUNTER (OUTPATIENT)
Dept: URBAN - METROPOLITAN AREA CLINIC 23 | Facility: CLINIC | Age: 75
End: 2025-07-29

## 2025-08-08 ENCOUNTER — WEB ENCOUNTER (OUTPATIENT)
Dept: URBAN - METROPOLITAN AREA CLINIC 23 | Facility: CLINIC | Age: 75
End: 2025-08-08

## 2025-08-08 RX ORDER — METRONIDAZOLE 500 MG/1
1 TABLET TABLET ORAL THREE TIMES A DAY
Qty: 42 TABLET | Refills: 0
Start: 2023-06-14 | End: 2025-08-24

## 2025-08-09 ENCOUNTER — WEB ENCOUNTER (OUTPATIENT)
Dept: URBAN - METROPOLITAN AREA CLINIC 23 | Facility: CLINIC | Age: 75
End: 2025-08-09

## 2025-08-14 ENCOUNTER — OFFICE VISIT (OUTPATIENT)
Dept: URBAN - METROPOLITAN AREA CLINIC 36 | Facility: CLINIC | Age: 75
End: 2025-08-14
Payer: MEDICARE

## 2025-08-14 DIAGNOSIS — K74.01 EARLY HEPATIC FIBROSIS: ICD-10-CM

## 2025-08-14 DIAGNOSIS — K76.0 FATTY (CHANGE OF) LIVER: ICD-10-CM

## 2025-08-14 PROCEDURE — 76981 USE PARENCHYMA: CPT | Performed by: INTERNAL MEDICINE

## 2025-08-14 RX ORDER — DULOXETINE HYDROCHLORIDE 30 MG/1
1 CAPSULE CAPSULE, DELAYED RELEASE ORAL ONCE A DAY
Status: ACTIVE | COMMUNITY

## 2025-08-14 RX ORDER — PANTOPRAZOLE SODIUM 20 MG/1
1 TABLET 1/2 TO 1 HOUR BEFORE MORNING MEAL TABLET, DELAYED RELEASE ORAL DAILY
Status: ACTIVE | COMMUNITY

## 2025-08-14 RX ORDER — AZATHIOPRINE 75 MG/1
AS DIRECTED TABLET ORAL ONCE DAILY
Qty: 90 | Refills: 3 | Status: ACTIVE | COMMUNITY
End: 2026-03-26

## 2025-08-14 RX ORDER — METRONIDAZOLE 500 MG/1
1 TABLET TABLET ORAL THREE TIMES A DAY
Qty: 42 TABLET | Refills: 0 | Status: ACTIVE | COMMUNITY
Start: 2023-06-14 | End: 2025-08-24

## 2025-08-14 RX ORDER — LIFITEGRAST 50 MG/ML
1 DROP INTO AFFECTED EYE SOLUTION/ DROPS OPHTHALMIC TWICE A DAY
Status: ACTIVE | COMMUNITY

## 2025-08-14 RX ORDER — BRIMONIDINE TARTRATE, TIMOLOL MALEATE 2; 5 MG/ML; MG/ML
1 DROP INTO AFFECTED EYE SOLUTION/ DROPS OPHTHALMIC TWICE A DAY
Status: ACTIVE | COMMUNITY

## 2025-08-14 RX ORDER — DENOSUMAB 60 MG/ML
AS DIRECTED INJECTION SUBCUTANEOUS
Status: ACTIVE | COMMUNITY

## 2025-08-18 ENCOUNTER — CLAIMS CREATED FROM THE CLAIM WINDOW (OUTPATIENT)
Dept: URBAN - METROPOLITAN AREA CLINIC 117 | Facility: CLINIC | Age: 75
End: 2025-08-18
Payer: MEDICARE

## 2025-08-18 DIAGNOSIS — K74.01 EARLY HEPATIC FIBROSIS: ICD-10-CM

## 2025-08-18 DIAGNOSIS — K76.0 FATTY (CHANGE OF) LIVER: ICD-10-CM

## 2025-08-18 PROCEDURE — 76981 USE PARENCHYMA: CPT | Performed by: INTERNAL MEDICINE
